# Patient Record
Sex: FEMALE | Race: WHITE | Employment: FULL TIME
[De-identification: names, ages, dates, MRNs, and addresses within clinical notes are randomized per-mention and may not be internally consistent; named-entity substitution may affect disease eponyms.]

---

## 2017-01-09 DIAGNOSIS — R19.5 HEME POSITIVE STOOL: Primary | ICD-10-CM

## 2017-01-09 DIAGNOSIS — Z12.11 COLON CANCER SCREENING: Primary | ICD-10-CM

## 2017-01-09 DIAGNOSIS — Z12.11 COLON CANCER SCREENING: ICD-10-CM

## 2017-01-09 LAB
CONTROL: ABNORMAL
HEMOCCULT STL QL: POSITIVE

## 2017-01-18 ENCOUNTER — TELEPHONE (OUTPATIENT)
Dept: GASTROENTEROLOGY | Facility: CLINIC | Age: 57
End: 2017-01-18

## 2017-02-28 DIAGNOSIS — Z12.11 SCREENING FOR COLON CANCER: Primary | ICD-10-CM

## 2017-03-16 ENCOUNTER — ANESTHESIA (OUTPATIENT)
Dept: ENDOSCOPY | Age: 57
End: 2017-03-16

## 2017-03-16 ENCOUNTER — HOSPITAL ENCOUNTER (OUTPATIENT)
Age: 57
Setting detail: OUTPATIENT SURGERY
Discharge: HOME | End: 2017-03-16
Attending: INTERNAL MEDICINE | Admitting: INTERNAL MEDICINE

## 2017-03-16 ENCOUNTER — ANESTHESIA EVENT (OUTPATIENT)
Dept: ENDOSCOPY | Age: 57
End: 2017-03-16

## 2017-03-16 VITALS
DIASTOLIC BLOOD PRESSURE: 78 MMHG | RESPIRATION RATE: 17 BRPM | SYSTOLIC BLOOD PRESSURE: 121 MMHG | OXYGEN SATURATION: 94 %

## 2017-03-16 VITALS
BODY MASS INDEX: 33.99 KG/M2 | HEIGHT: 61 IN | RESPIRATION RATE: 18 BRPM | HEART RATE: 81 BPM | WEIGHT: 180 LBS | SYSTOLIC BLOOD PRESSURE: 141 MMHG | OXYGEN SATURATION: 98 % | DIASTOLIC BLOOD PRESSURE: 82 MMHG | TEMPERATURE: 98.4 F

## 2017-03-16 PROCEDURE — 3700000000 HC ANESTHESIA ATTENDED CARE: Performed by: INTERNAL MEDICINE

## 2017-03-16 PROCEDURE — 6360000002 HC RX W HCPCS: Performed by: NURSE ANESTHETIST, CERTIFIED REGISTERED

## 2017-03-16 PROCEDURE — 7100000011 HC PHASE II RECOVERY - ADDTL 15 MIN: Performed by: INTERNAL MEDICINE

## 2017-03-16 PROCEDURE — 2580000003 HC RX 258: Performed by: INTERNAL MEDICINE

## 2017-03-16 PROCEDURE — 2500000003 HC RX 250 WO HCPCS: Performed by: NURSE ANESTHETIST, CERTIFIED REGISTERED

## 2017-03-16 PROCEDURE — 3609027000 HC COLONOSCOPY: Performed by: INTERNAL MEDICINE

## 2017-03-16 PROCEDURE — 93005 ELECTROCARDIOGRAM TRACING: CPT

## 2017-03-16 PROCEDURE — 3700000001 HC ADD 15 MINUTES (ANESTHESIA): Performed by: INTERNAL MEDICINE

## 2017-03-16 PROCEDURE — 7100000010 HC PHASE II RECOVERY - FIRST 15 MIN: Performed by: INTERNAL MEDICINE

## 2017-03-16 PROCEDURE — 6360000002 HC RX W HCPCS: Performed by: ANESTHESIOLOGY

## 2017-03-16 RX ORDER — PROPOFOL 10 MG/ML
INJECTION, EMULSION INTRAVENOUS PRN
Status: DISCONTINUED | OUTPATIENT
Start: 2017-03-16 | End: 2017-03-16 | Stop reason: SDUPTHER

## 2017-03-16 RX ORDER — ALBUTEROL SULFATE 2.5 MG/3ML
2.5 SOLUTION RESPIRATORY (INHALATION) ONCE
Status: COMPLETED | OUTPATIENT
Start: 2017-03-16 | End: 2017-03-16

## 2017-03-16 RX ORDER — LIDOCAINE HYDROCHLORIDE 10 MG/ML
INJECTION, SOLUTION EPIDURAL; INFILTRATION; INTRACAUDAL; PERINEURAL PRN
Status: DISCONTINUED | OUTPATIENT
Start: 2017-03-16 | End: 2017-03-16 | Stop reason: SDUPTHER

## 2017-03-16 RX ORDER — SODIUM CHLORIDE 9 MG/ML
INJECTION, SOLUTION INTRAVENOUS CONTINUOUS
Status: DISCONTINUED | OUTPATIENT
Start: 2017-03-16 | End: 2017-03-16 | Stop reason: HOSPADM

## 2017-03-16 RX ADMIN — PROPOFOL 20 MG: 10 INJECTION, EMULSION INTRAVENOUS at 13:10

## 2017-03-16 RX ADMIN — PROPOFOL 30 MG: 10 INJECTION, EMULSION INTRAVENOUS at 13:02

## 2017-03-16 RX ADMIN — PROPOFOL 40 MG: 10 INJECTION, EMULSION INTRAVENOUS at 13:05

## 2017-03-16 RX ADMIN — LIDOCAINE HYDROCHLORIDE 50 MG: 10 INJECTION, SOLUTION EPIDURAL; INFILTRATION; INTRACAUDAL; PERINEURAL at 12:45

## 2017-03-16 RX ADMIN — PROPOFOL 20 MG: 10 INJECTION, EMULSION INTRAVENOUS at 12:50

## 2017-03-16 RX ADMIN — PROPOFOL 30 MG: 10 INJECTION, EMULSION INTRAVENOUS at 12:58

## 2017-03-16 RX ADMIN — PROPOFOL 50 MG: 10 INJECTION, EMULSION INTRAVENOUS at 12:52

## 2017-03-16 RX ADMIN — SODIUM CHLORIDE: 9 INJECTION, SOLUTION INTRAVENOUS at 10:01

## 2017-03-16 RX ADMIN — SODIUM CHLORIDE: 9 INJECTION, SOLUTION INTRAVENOUS at 12:38

## 2017-03-16 RX ADMIN — METOPROLOL TARTRATE 1 MG: 5 INJECTION, SOLUTION INTRAVENOUS at 12:53

## 2017-03-16 RX ADMIN — METOPROLOL TARTRATE 1 MG: 5 INJECTION, SOLUTION INTRAVENOUS at 13:10

## 2017-03-16 RX ADMIN — ALBUTEROL SULFATE 2.5 MG: 2.5 SOLUTION RESPIRATORY (INHALATION) at 10:43

## 2017-03-16 RX ADMIN — PROPOFOL 50 MG: 10 INJECTION, EMULSION INTRAVENOUS at 12:55

## 2017-03-16 RX ADMIN — PROPOFOL 80 MG: 10 INJECTION, EMULSION INTRAVENOUS at 12:49

## 2017-03-16 ASSESSMENT — PAIN SCALES - GENERAL
PAINLEVEL_OUTOF10: 0

## 2017-03-16 ASSESSMENT — PAIN - FUNCTIONAL ASSESSMENT: PAIN_FUNCTIONAL_ASSESSMENT: 0-10

## 2017-03-17 LAB
EKG ATRIAL RATE: 81 BPM
EKG P AXIS: 58 DEGREES
EKG P-R INTERVAL: 134 MS
EKG Q-T INTERVAL: 376 MS
EKG QRS DURATION: 68 MS
EKG QTC CALCULATION (BAZETT): 436 MS
EKG R AXIS: 57 DEGREES
EKG T AXIS: 50 DEGREES
EKG VENTRICULAR RATE: 81 BPM

## 2017-03-30 ENCOUNTER — TELEPHONE (OUTPATIENT)
Dept: INTERNAL MEDICINE | Age: 57
End: 2017-03-30

## 2017-03-30 DIAGNOSIS — K57.31 DIVERTICULOSIS OF LARGE INTESTINE WITH HEMORRHAGE: Primary | ICD-10-CM

## 2017-04-13 ENCOUNTER — OFFICE VISIT (OUTPATIENT)
Dept: INTERNAL MEDICINE | Age: 57
End: 2017-04-13

## 2017-04-13 VITALS
DIASTOLIC BLOOD PRESSURE: 85 MMHG | HEART RATE: 72 BPM | RESPIRATION RATE: 18 BRPM | SYSTOLIC BLOOD PRESSURE: 141 MMHG | WEIGHT: 171 LBS | BODY MASS INDEX: 32.58 KG/M2

## 2017-04-13 DIAGNOSIS — I10 ESSENTIAL HYPERTENSION: Primary | ICD-10-CM

## 2017-04-13 DIAGNOSIS — F17.200 SMOKER: ICD-10-CM

## 2017-04-13 DIAGNOSIS — E78.00 PURE HYPERCHOLESTEROLEMIA: ICD-10-CM

## 2017-04-13 DIAGNOSIS — R06.2 INSPIRATORY WHEEZE ON EXAMINATION: ICD-10-CM

## 2017-04-13 PROCEDURE — 99213 OFFICE O/P EST LOW 20 MIN: CPT | Performed by: INTERNAL MEDICINE

## 2017-04-13 RX ORDER — ATENOLOL 50 MG/1
50 TABLET ORAL DAILY
Refills: 1 | COMMUNITY
Start: 2017-04-10 | End: 2017-10-30 | Stop reason: SDUPTHER

## 2017-04-13 ASSESSMENT — PATIENT HEALTH QUESTIONNAIRE - PHQ9
7. TROUBLE CONCENTRATING ON THINGS, SUCH AS READING THE NEWSPAPER OR WATCHING TELEVISION: 1
4. FEELING TIRED OR HAVING LITTLE ENERGY: 1
9. THOUGHTS THAT YOU WOULD BE BETTER OFF DEAD, OR OF HURTING YOURSELF: 0
SUM OF ALL RESPONSES TO PHQ9 QUESTIONS 1 & 2: 0
10. IF YOU CHECKED OFF ANY PROBLEMS, HOW DIFFICULT HAVE THESE PROBLEMS MADE IT FOR YOU TO DO YOUR WORK, TAKE CARE OF THINGS AT HOME, OR GET ALONG WITH OTHER PEOPLE: 1
2. FEELING DOWN, DEPRESSED OR HOPELESS: 0
6. FEELING BAD ABOUT YOURSELF - OR THAT YOU ARE A FAILURE OR HAVE LET YOURSELF OR YOUR FAMILY DOWN: 0
8. MOVING OR SPEAKING SO SLOWLY THAT OTHER PEOPLE COULD HAVE NOTICED. OR THE OPPOSITE, BEING SO FIGETY OR RESTLESS THAT YOU HAVE BEEN MOVING AROUND A LOT MORE THAN USUAL: 0
1. LITTLE INTEREST OR PLEASURE IN DOING THINGS: 0
SUM OF ALL RESPONSES TO PHQ QUESTIONS 1-9: 4
3. TROUBLE FALLING OR STAYING ASLEEP: 1
5. POOR APPETITE OR OVEREATING: 1

## 2017-04-13 ASSESSMENT — ENCOUNTER SYMPTOMS
ALLERGIC/IMMUNOLOGIC NEGATIVE: 1
RESPIRATORY NEGATIVE: 1
EYES NEGATIVE: 1

## 2017-04-19 ENCOUNTER — HOSPITAL ENCOUNTER (OUTPATIENT)
Dept: GENERAL RADIOLOGY | Age: 57
Discharge: HOME | End: 2017-04-19

## 2017-04-19 DIAGNOSIS — R06.2 INSPIRATORY WHEEZE ON EXAMINATION: ICD-10-CM

## 2017-04-19 DIAGNOSIS — K57.31 DIVERTICULOSIS OF LARGE INTESTINE WITH HEMORRHAGE: ICD-10-CM

## 2017-04-19 DIAGNOSIS — F17.200 SMOKER: ICD-10-CM

## 2017-04-19 PROCEDURE — 71020 XR CHEST STANDARD TWO VW: CPT

## 2017-04-19 PROCEDURE — 74280 X-RAY XM COLON 2CNTRST STD: CPT

## 2017-05-13 DIAGNOSIS — I10 ESSENTIAL HYPERTENSION: ICD-10-CM

## 2017-05-15 RX ORDER — ATENOLOL 50 MG/1
TABLET ORAL
Qty: 30 TABLET | Refills: 11 | Status: SHIPPED | OUTPATIENT
Start: 2017-05-15 | End: 2017-10-30 | Stop reason: DRUGHIGH

## 2017-10-30 ENCOUNTER — OFFICE VISIT (OUTPATIENT)
Dept: INTERNAL MEDICINE | Age: 57
End: 2017-10-30

## 2017-10-30 VITALS
WEIGHT: 176.8 LBS | BODY MASS INDEX: 32.54 KG/M2 | HEART RATE: 65 BPM | SYSTOLIC BLOOD PRESSURE: 144 MMHG | HEIGHT: 62 IN | DIASTOLIC BLOOD PRESSURE: 88 MMHG

## 2017-10-30 DIAGNOSIS — Z83.3 FAMILY HISTORY OF DIABETES MELLITUS (DM): ICD-10-CM

## 2017-10-30 DIAGNOSIS — E78.00 PURE HYPERCHOLESTEROLEMIA: ICD-10-CM

## 2017-10-30 DIAGNOSIS — J45.20 MILD INTERMITTENT ASTHMA WITHOUT COMPLICATION: ICD-10-CM

## 2017-10-30 DIAGNOSIS — I10 ESSENTIAL HYPERTENSION: Primary | ICD-10-CM

## 2017-10-30 DIAGNOSIS — Z11.59 NEED FOR HEPATITIS C SCREENING TEST: ICD-10-CM

## 2017-10-30 DIAGNOSIS — Z11.4 SCREENING FOR HIV (HUMAN IMMUNODEFICIENCY VIRUS): ICD-10-CM

## 2017-10-30 DIAGNOSIS — Z23 NEEDS FLU SHOT: ICD-10-CM

## 2017-10-30 DIAGNOSIS — Z23 NEED FOR PROPHYLACTIC VACCINATION AGAINST STREPTOCOCCUS PNEUMONIAE (PNEUMOCOCCUS): ICD-10-CM

## 2017-10-30 LAB — HBA1C MFR BLD: 5.8 %

## 2017-10-30 PROCEDURE — 99213 OFFICE O/P EST LOW 20 MIN: CPT

## 2017-10-30 PROCEDURE — 90732 PPSV23 VACC 2 YRS+ SUBQ/IM: CPT

## 2017-10-30 PROCEDURE — 83036 HEMOGLOBIN GLYCOSYLATED A1C: CPT | Performed by: INTERNAL MEDICINE

## 2017-10-30 PROCEDURE — 90471 IMMUNIZATION ADMIN: CPT | Performed by: INTERNAL MEDICINE

## 2017-10-30 PROCEDURE — 99214 OFFICE O/P EST MOD 30 MIN: CPT | Performed by: INTERNAL MEDICINE

## 2017-10-30 PROCEDURE — 90472 IMMUNIZATION ADMIN EACH ADD: CPT | Performed by: INTERNAL MEDICINE

## 2017-10-30 PROCEDURE — 90688 IIV4 VACCINE SPLT 0.5 ML IM: CPT

## 2017-10-30 RX ORDER — ALBUTEROL SULFATE 90 UG/1
2 AEROSOL, METERED RESPIRATORY (INHALATION) EVERY 6 HOURS PRN
Qty: 1 INHALER | Refills: 3 | Status: SHIPPED | OUTPATIENT
Start: 2017-10-30 | End: 2018-04-20 | Stop reason: SDUPTHER

## 2017-10-30 RX ORDER — HYDROCHLOROTHIAZIDE 25 MG/1
25 TABLET ORAL DAILY
Qty: 30 TABLET | Refills: 2 | Status: SHIPPED | OUTPATIENT
Start: 2017-10-30 | End: 2018-01-09 | Stop reason: SDUPTHER

## 2017-10-30 RX ORDER — ATENOLOL 100 MG/1
100 TABLET ORAL DAILY
Qty: 30 TABLET | Refills: 2 | Status: SHIPPED | OUTPATIENT
Start: 2017-10-30 | End: 2018-02-20 | Stop reason: SDUPTHER

## 2017-10-30 RX ORDER — PRAVASTATIN SODIUM 40 MG
40 TABLET ORAL DAILY
Qty: 30 TABLET | Refills: 2 | Status: SHIPPED | OUTPATIENT
Start: 2017-10-30 | End: 2018-01-28 | Stop reason: SDUPTHER

## 2017-10-30 ASSESSMENT — ENCOUNTER SYMPTOMS
DOUBLE VISION: 0
HEMOPTYSIS: 0
NAUSEA: 0
BLURRED VISION: 0
HEARTBURN: 0
ABDOMINAL PAIN: 0
COUGH: 0

## 2017-10-30 NOTE — PROGRESS NOTES
current facility-administered medications for this visit. Social History   Substance Use Topics    Smoking status: Current Every Day Smoker     Packs/day: 0.25     Years: 35.00     Types: Cigarettes    Smokeless tobacco: Never Used    Alcohol use No      Comment: recovering alcoholic       Family History   Problem Relation Age of Onset    Heart Disease Mother     Lung Cancer Mother 54    Heart Disease Father     Stroke Father         REVIEW OF SYSTEMS:  Review of Systems   Constitutional: Negative for chills and fever. HENT: Negative for congestion, ear discharge and nosebleeds. Eyes: Negative for blurred vision and double vision. Respiratory: Negative for cough and hemoptysis. Cardiovascular: Negative for chest pain and palpitations. Gastrointestinal: Negative for abdominal pain, heartburn and nausea. Genitourinary: Negative for dysuria and urgency. Musculoskeletal: Negative for myalgias and neck pain. Neurological: Negative for dizziness and headaches. Endo/Heme/Allergies: Does not bruise/bleed easily. Psychiatric/Behavioral: Negative for depression and suicidal ideas. PHYSICAL EXAM:  Vitals:    10/30/17 0832   BP: (!) 144/88   Site: Right Arm   Position: Sitting   Cuff Size: Medium Adult   Pulse: 65   Weight: 176 lb 12.8 oz (80.2 kg)   Height: 5' 1.75\" (1.568 m)     BP Readings from Last 3 Encounters:   10/30/17 (!) 144/88   04/13/17 (!) 141/85   03/16/17 (!) 141/82        Physical Exam   Constitutional: She is oriented to person, place, and time and well-developed, well-nourished, and in no distress. No distress. HENT:   Mouth/Throat: No oropharyngeal exudate. Neck: Normal range of motion. Neck supple. No thyromegaly present. Cardiovascular: Normal rate, regular rhythm, normal heart sounds and intact distal pulses. Pulmonary/Chest: Effort normal and breath sounds normal. No respiratory distress. She has no wheezes. Abdominal: Soft.  Bowel sounds are normal. She IM, MDV, 0.5mL (FLUZONE QUADV)  -     [place] IMMUNIZ ADMIN,1 SINGLE/COMB VAC/TOXOID    Screening for HIV (human immunodeficiency virus)  -     HIV-1 And HIV-2 Antibodies; Future    Need for hepatitis C screening test  -     Hepatitis Panel, Acute; Future    Other orders  -     Cancel: Glucose, Fasting; Future      FOLLOW UP AND INSTRUCTIONS:  · Return in about 3 months (around 1/30/2018) for HTN, HLD. · Lisbeth Osorio received counseling on the following healthy behaviors: nutrition and exercise    · Discussed use, benefit, and side effects of prescribed medications. Barriers to medication compliance addressed. All patient questions answered. Pt voiced understanding. · Patient given educational materials - see patient instructions    Agustin Beauchamp MD, LALA, [address] [place] [address] Internal Medicine Associate  10/30/2017, 10:42 AM    This note is created with the assistance of a speech-recognition program. While intending to generate a document that actually reflects the content of the visit, the document can still have some mistakes which may not have been identified and corrected by editing.

## 2017-10-30 NOTE — PROGRESS NOTES
HYPERTENSION visit     BP Readings from Last 3 Encounters:   04/13/17 (!) 141/85   03/16/17 (!) 141/82   03/16/17 121/78       LDL Cholesterol (mg/dL)   Date Value   09/08/2014 90     HDL (mg/dL)   Date Value   09/08/2014 36 (L)     BUN (mg/dL)   Date Value   09/08/2014 14     CREATININE (mg/dL)   Date Value   09/08/2014 0.84     Glucose (mg/dL)   Date Value   09/08/2014 82   04/10/2012 92              Have you changed or started any medications since your last visit including any over-the-counter medicines, vitamins, or herbal medicines? no   Have you stopped taking any of your medications? Is so, why? -  no  Are you having any side effects from any of your medications? - no    Have you seen any other physician or provider since your last visit?  no   Have you had any other diagnostic tests since your last visit? yes - Xray   Have you been seen in the emergency room and/or had an admission in a hospital since we last saw you?  no   Have you had your routine dental cleaning in the past 6 months?  no     Do you have an active MyChart account? If no, what is the barrier?   No: Declined    Patient Care Team:  Brittany Mcqueen MD as PCP - General (Internal Medicine)    Medical History Review  Past Medical, Family, and Social History reviewed and does contribute to the patient presenting condition    Health Maintenance   Topic Date Due    Pneumococcal med risk (1 of 1 - PPSV23) 04/12/1977    Diabetes screen  04/12/1998    Flu vaccine (1) 09/01/2017    Hepatitis C screen  12/28/2017 (Originally 1960)    HIV screen  12/28/2017 (Originally 4/12/1973)    Cervical cancer screen  11/23/2018    Breast cancer screen  01/30/2019    Lipid screen  09/08/2019    Colon cancer screen colonoscopy  03/16/2022    DTaP/Tdap/Td vaccine (2 - Td) 09/08/2024

## 2017-10-30 NOTE — LETTER
[address]  [address] [facility] [address] [facility]  Phone: [phone]  Fax: [phone]    Agustin Hong MD        October 30, 2017     Patient: Karlo Garza   YOB: 1960   Date of Visit: 10/30/2017       To Whom it May Concern:    Duc Yeung was seen in my clinic on 10/30/2017. If you have any questions or concerns, please don't hesitate to call.     Sincerely,         Agustin Hong MD

## 2018-01-09 DIAGNOSIS — I10 ESSENTIAL HYPERTENSION: ICD-10-CM

## 2018-01-09 RX ORDER — HYDROCHLOROTHIAZIDE 25 MG/1
TABLET ORAL
Qty: 30 TABLET | Refills: 2 | Status: SHIPPED | OUTPATIENT
Start: 2018-01-09 | End: 2018-04-20 | Stop reason: SDUPTHER

## 2018-01-28 DIAGNOSIS — E78.00 PURE HYPERCHOLESTEROLEMIA: ICD-10-CM

## 2018-01-29 RX ORDER — PRAVASTATIN SODIUM 40 MG
TABLET ORAL
Qty: 30 TABLET | Refills: 2 | Status: SHIPPED | OUTPATIENT
Start: 2018-01-29 | End: 2018-04-20 | Stop reason: SDUPTHER

## 2018-02-20 DIAGNOSIS — I10 ESSENTIAL HYPERTENSION: ICD-10-CM

## 2018-02-21 RX ORDER — ATENOLOL 100 MG/1
TABLET ORAL
Qty: 30 TABLET | Refills: 2 | Status: SHIPPED | OUTPATIENT
Start: 2018-02-21 | End: 2018-04-20 | Stop reason: SDUPTHER

## 2018-04-20 ENCOUNTER — HOSPITAL ENCOUNTER (OUTPATIENT)
Age: 58
Setting detail: SPECIMEN
Discharge: HOME | End: 2018-04-20

## 2018-04-20 ENCOUNTER — OFFICE VISIT (OUTPATIENT)
Dept: INTERNAL MEDICINE | Age: 58
End: 2018-04-20

## 2018-04-20 VITALS
SYSTOLIC BLOOD PRESSURE: 128 MMHG | HEART RATE: 77 BPM | HEIGHT: 62 IN | WEIGHT: 176.1 LBS | DIASTOLIC BLOOD PRESSURE: 78 MMHG | BODY MASS INDEX: 32.41 KG/M2

## 2018-04-20 DIAGNOSIS — I10 ESSENTIAL HYPERTENSION: ICD-10-CM

## 2018-04-20 DIAGNOSIS — I10 ESSENTIAL HYPERTENSION: Primary | ICD-10-CM

## 2018-04-20 DIAGNOSIS — J45.20 MILD INTERMITTENT ASTHMA WITHOUT COMPLICATION: ICD-10-CM

## 2018-04-20 DIAGNOSIS — E78.00 PURE HYPERCHOLESTEROLEMIA: ICD-10-CM

## 2018-04-20 LAB
ANION GAP SERPL CALCULATED.3IONS-SCNC: 13 MMOL/L (ref 9–17)
BUN BLDV-MCNC: 18 MG/DL (ref 8–23)
BUN/CREAT BLD: ABNORMAL (ref 9–20)
CALCIUM SERPL-MCNC: 9 MG/DL (ref 8.6–10.4)
CHLORIDE BLD-SCNC: 99 MMOL/L (ref 98–107)
CO2: 30 MMOL/L (ref 20–31)
CREAT SERPL-MCNC: 1 MG/DL (ref 0.5–0.9)
GFR AFRICAN AMERICAN: >60 ML/MIN
GFR NON-AFRICAN AMERICAN: 57 ML/MIN
GFR SERPL CREATININE-BSD FRML MDRD: ABNORMAL ML/MIN/{1.73_M2}
GFR SERPL CREATININE-BSD FRML MDRD: ABNORMAL ML/MIN/{1.73_M2}
GLUCOSE BLD-MCNC: 87 MG/DL (ref 70–99)
POTASSIUM SERPL-SCNC: 3.9 MMOL/L (ref 3.7–5.3)
SODIUM BLD-SCNC: 142 MMOL/L (ref 135–144)

## 2018-04-20 PROCEDURE — 80048 BASIC METABOLIC PNL TOTAL CA: CPT

## 2018-04-20 PROCEDURE — 99214 OFFICE O/P EST MOD 30 MIN: CPT | Performed by: INTERNAL MEDICINE

## 2018-04-20 PROCEDURE — 36415 COLL VENOUS BLD VENIPUNCTURE: CPT

## 2018-04-20 PROCEDURE — 99212 OFFICE O/P EST SF 10 MIN: CPT

## 2018-04-20 RX ORDER — ALBUTEROL SULFATE 90 UG/1
2 AEROSOL, METERED RESPIRATORY (INHALATION) EVERY 6 HOURS PRN
Qty: 1 INHALER | Refills: 2 | Status: SHIPPED | OUTPATIENT
Start: 2018-04-20 | End: 2019-05-30 | Stop reason: SDUPTHER

## 2018-04-20 RX ORDER — HYDROCHLOROTHIAZIDE 25 MG/1
TABLET ORAL
Qty: 30 TABLET | Refills: 2 | Status: SHIPPED | OUTPATIENT
Start: 2018-04-20 | End: 2018-07-11 | Stop reason: SDUPTHER

## 2018-04-20 RX ORDER — PRAVASTATIN SODIUM 40 MG
TABLET ORAL
Qty: 30 TABLET | Refills: 2 | Status: SHIPPED | OUTPATIENT
Start: 2018-04-20 | End: 2018-07-11 | Stop reason: SDUPTHER

## 2018-04-20 RX ORDER — ATENOLOL 100 MG/1
TABLET ORAL
Qty: 30 TABLET | Refills: 2 | Status: SHIPPED | OUTPATIENT
Start: 2018-04-20 | End: 2018-08-07 | Stop reason: SDUPTHER

## 2018-04-20 ASSESSMENT — PATIENT HEALTH QUESTIONNAIRE - PHQ9
SUM OF ALL RESPONSES TO PHQ QUESTIONS 1-9: 0
2. FEELING DOWN, DEPRESSED OR HOPELESS: 0
SUM OF ALL RESPONSES TO PHQ9 QUESTIONS 1 & 2: 0
1. LITTLE INTEREST OR PLEASURE IN DOING THINGS: 0

## 2018-04-20 ASSESSMENT — ENCOUNTER SYMPTOMS
HEARTBURN: 0
DOUBLE VISION: 0
ABDOMINAL PAIN: 0
COUGH: 0
NAUSEA: 0
BLURRED VISION: 0
HEMOPTYSIS: 0

## 2018-05-24 ENCOUNTER — TELEPHONE (OUTPATIENT)
Dept: INTERNAL MEDICINE | Age: 58
End: 2018-05-24

## 2018-07-02 ENCOUNTER — TELEPHONE (OUTPATIENT)
Dept: INTERNAL MEDICINE | Age: 58
End: 2018-07-02

## 2018-07-11 DIAGNOSIS — E78.00 PURE HYPERCHOLESTEROLEMIA: ICD-10-CM

## 2018-07-11 DIAGNOSIS — I10 ESSENTIAL HYPERTENSION: ICD-10-CM

## 2018-07-11 RX ORDER — HYDROCHLOROTHIAZIDE 25 MG/1
TABLET ORAL
Qty: 30 TABLET | Refills: 2 | Status: SHIPPED | OUTPATIENT
Start: 2018-07-11 | End: 2018-10-03 | Stop reason: SDUPTHER

## 2018-07-11 RX ORDER — PRAVASTATIN SODIUM 40 MG
TABLET ORAL
Qty: 30 TABLET | Refills: 2 | Status: SHIPPED | OUTPATIENT
Start: 2018-07-11 | End: 2018-10-03 | Stop reason: SDUPTHER

## 2018-07-11 NOTE — TELEPHONE ENCOUNTER
E-scribe request for HYDROCHLOROTHIAZIDE 25 MG TAB and PRAVASTATIN 40 MG TAB. Please review and e-scribe if applicable.      Last Visit Date:  4/20/18  Next Visit Date:  Tickler Call in Place for f/u    Hemoglobin A1C (%)   Date Value   10/30/2017 5.8             ( goal A1C is < 7)   No results found for: LABMICR  LDL Cholesterol (mg/dL)   Date Value   09/08/2014 90       (goal LDL is <100)   BUN (mg/dL)   Date Value   04/20/2018 18     BP Readings from Last 3 Encounters:   04/20/18 128/78   10/30/17 (!) 144/88   04/13/17 (!) 141/85          (goal 120/80)        Patient Active Problem List:     Essential hypertension     Pure hypercholesterolemia     Mild intermittent asthma without complication      ----JF

## 2018-08-07 DIAGNOSIS — I10 ESSENTIAL HYPERTENSION: ICD-10-CM

## 2018-08-07 RX ORDER — ATENOLOL 100 MG/1
TABLET ORAL
Qty: 30 TABLET | Refills: 2 | Status: SHIPPED | OUTPATIENT
Start: 2018-08-07 | End: 2018-11-15 | Stop reason: SDUPTHER

## 2018-10-03 DIAGNOSIS — E78.00 PURE HYPERCHOLESTEROLEMIA: ICD-10-CM

## 2018-10-03 DIAGNOSIS — I10 ESSENTIAL HYPERTENSION: ICD-10-CM

## 2018-10-03 RX ORDER — PRAVASTATIN SODIUM 40 MG
TABLET ORAL
Qty: 30 TABLET | Refills: 2 | Status: SHIPPED | OUTPATIENT
Start: 2018-10-03 | End: 2018-12-28 | Stop reason: SDUPTHER

## 2018-10-03 RX ORDER — HYDROCHLOROTHIAZIDE 25 MG/1
TABLET ORAL
Qty: 30 TABLET | Refills: 2 | Status: SHIPPED | OUTPATIENT
Start: 2018-10-03 | End: 2018-12-28 | Stop reason: SDUPTHER

## 2018-11-15 DIAGNOSIS — I10 ESSENTIAL HYPERTENSION: ICD-10-CM

## 2018-11-15 RX ORDER — ATENOLOL 100 MG/1
100 TABLET ORAL DAILY
Qty: 30 TABLET | Refills: 2 | Status: SHIPPED | OUTPATIENT
Start: 2018-11-15 | End: 2019-02-03 | Stop reason: SDUPTHER

## 2018-12-28 DIAGNOSIS — E78.00 PURE HYPERCHOLESTEROLEMIA: ICD-10-CM

## 2018-12-28 DIAGNOSIS — I10 ESSENTIAL HYPERTENSION: ICD-10-CM

## 2018-12-28 RX ORDER — HYDROCHLOROTHIAZIDE 25 MG/1
25 TABLET ORAL DAILY
Qty: 30 TABLET | Refills: 2 | Status: SHIPPED | OUTPATIENT
Start: 2018-12-28 | End: 2019-05-18 | Stop reason: SDUPTHER

## 2018-12-28 RX ORDER — PRAVASTATIN SODIUM 40 MG
40 TABLET ORAL DAILY
Qty: 30 TABLET | Refills: 2 | Status: SHIPPED | OUTPATIENT
Start: 2018-12-28 | End: 2019-05-17 | Stop reason: SDUPTHER

## 2018-12-28 NOTE — TELEPHONE ENCOUNTER
Refill on medication.     Last visit: 4/20/18  Last Med refill: 12/3/18  Does patient have enough medication for 72 hours: Yes    Next Visit Date:  Future Appointments  Date Time Provider Tessa Spencer   2/13/2019 2:45 PM Agustin Russell MD [address] [facility] Maintenance   Topic Date Due    Hepatitis C screen  1960    HIV screen  04/12/1973    Shingles Vaccine (1 of 2 - 2 Dose Series) 04/12/2008    Flu vaccine (1) 09/01/2018    A1C test (Diabetic or Prediabetic)  10/30/2018    Cervical cancer screen  11/23/2018    Breast cancer screen  01/30/2019    Potassium monitoring  04/20/2019    Creatinine monitoring  04/20/2019    Lipid screen  09/08/2019    Colon cancer screen colonoscopy  03/16/2022    DTaP/Tdap/Td vaccine (2 - Td) 09/08/2024    Pneumococcal med risk  Completed       Hemoglobin A1C (%)   Date Value   10/30/2017 5.8             ( goal A1C is < 7)   No results found for: LABMICR  LDL Cholesterol (mg/dL)   Date Value   09/08/2014 90   03/20/2014 108 (H)       (goal LDL is <100)   BUN (mg/dL)   Date Value   04/20/2018 18     BP Readings from Last 3 Encounters:   04/20/18 128/78   10/30/17 (!) 144/88   04/13/17 (!) 141/85          (goal 120/80)    All Future Testing planned in [facility]              Patient Active Problem List:     Essential hypertension     Pure hypercholesterolemia     Mild intermittent asthma without complication

## 2019-02-03 DIAGNOSIS — I10 ESSENTIAL HYPERTENSION: ICD-10-CM

## 2019-02-04 RX ORDER — ATENOLOL 100 MG/1
TABLET ORAL
Qty: 90 TABLET | Refills: 2 | Status: SHIPPED | OUTPATIENT
Start: 2019-02-04 | End: 2019-05-17 | Stop reason: SDUPTHER

## 2019-05-17 DIAGNOSIS — E78.00 PURE HYPERCHOLESTEROLEMIA: ICD-10-CM

## 2019-05-17 DIAGNOSIS — I10 ESSENTIAL HYPERTENSION: ICD-10-CM

## 2019-05-17 NOTE — TELEPHONE ENCOUNTER
Pravastatin and hydrochlorothiazide pending for refill     Health Maintenance   Topic Date Due    Hepatitis C screen  1960    HIV screen  04/12/1973    Shingles Vaccine (1 of 2) 04/12/2008    A1C test (Diabetic or Prediabetic)  10/30/2018    Cervical cancer screen  11/23/2018    Breast cancer screen  01/30/2019    Potassium monitoring  04/20/2019    Creatinine monitoring  04/20/2019    Flu vaccine (Season Ended) 09/01/2019    Lipid screen  09/08/2019    Colon cancer screen colonoscopy  03/16/2022    DTaP/Tdap/Td vaccine (2 - Td) 09/08/2024    Pneumococcal 0-64 years Vaccine  Completed             (applicable per patient's age: Cancer Screenings, Depression Screening, Fall Risk Screening, Immunizations)    Hemoglobin A1C (%)   Date Value   10/30/2017 5.8     LDL Cholesterol (mg/dL)   Date Value   09/08/2014 90     BUN (mg/dL)   Date Value   04/20/2018 18      (goal A1C is < 7)   (goal LDL is <100) need 30-50% reduction from baseline     BP Readings from Last 3 Encounters:   04/20/18 128/78   10/30/17 (!) 144/88   04/13/17 (!) 141/85    (goal /80)      All Future Testing planned in [facility]:      Next Visit Date:  Future Appointments   Date Time Provider Tessa Spencer   5/30/2019  2:15 PM Agustin Benitez MD [id number]            Patient Active Problem List:     Essential hypertension     Pure hypercholesterolemia     Mild intermittent asthma without complication

## 2019-05-17 NOTE — TELEPHONE ENCOUNTER
Refill for med pended        Next Visit Date:  Future Appointments   Date Time Provider Tessa Spencer   5/30/2019  2:15 PM Agustin Manzanares MD [address] [facility]   Topic Date Due    Hepatitis C screen  1960    HIV screen  04/12/1973    Shingles Vaccine (1 of 2) 04/12/2008    A1C test (Diabetic or Prediabetic)  10/30/2018    Cervical cancer screen  11/23/2018    Breast cancer screen  01/30/2019    Potassium monitoring  04/20/2019    Creatinine monitoring  04/20/2019    Flu vaccine (Season Ended) 09/01/2019    Lipid screen  09/08/2019    Colon cancer screen colonoscopy  03/16/2022    DTaP/Tdap/Td vaccine (2 - Td) 09/08/2024    Pneumococcal 0-64 years Vaccine  Completed       Hemoglobin A1C (%)   Date Value   10/30/2017 5.8             ( goal A1C is < 7)   No results found for: LABMICR  LDL Cholesterol (mg/dL)   Date Value   09/08/2014 90   03/20/2014 108 (H)       (goal LDL is <100)   BUN (mg/dL)   Date Value   04/20/2018 18     BP Readings from Last 3 Encounters:   04/20/18 128/78   10/30/17 (!) 144/88   04/13/17 (!) 141/85          (goal 120/80)    All Future Testing planned in [facility]              Patient Active Problem List:     Essential hypertension     Pure hypercholesterolemia     Mild intermittent asthma without complication

## 2019-05-18 RX ORDER — PRAVASTATIN SODIUM 40 MG
TABLET ORAL
Qty: 90 TABLET | Refills: 2 | Status: SHIPPED | OUTPATIENT
Start: 2019-05-18 | End: 2020-02-03

## 2019-05-18 RX ORDER — HYDROCHLOROTHIAZIDE 25 MG/1
25 TABLET ORAL DAILY
Qty: 30 TABLET | Refills: 2 | Status: SHIPPED | OUTPATIENT
Start: 2019-05-18 | End: 2020-02-10 | Stop reason: SDUPTHER

## 2019-05-18 RX ORDER — HYDROCHLOROTHIAZIDE 25 MG/1
TABLET ORAL
Qty: 90 TABLET | Refills: 2 | Status: SHIPPED | OUTPATIENT
Start: 2019-05-18 | End: 2019-05-30 | Stop reason: SDUPTHER

## 2019-05-18 RX ORDER — ATENOLOL 100 MG/1
TABLET ORAL
Qty: 90 TABLET | Refills: 2 | Status: SHIPPED | OUTPATIENT
Start: 2019-05-18 | End: 2020-01-02

## 2019-05-30 ENCOUNTER — OFFICE VISIT (OUTPATIENT)
Dept: INTERNAL MEDICINE | Age: 59
End: 2019-05-30

## 2019-05-30 VITALS
SYSTOLIC BLOOD PRESSURE: 151 MMHG | DIASTOLIC BLOOD PRESSURE: 85 MMHG | BODY MASS INDEX: 31.69 KG/M2 | WEIGHT: 172.2 LBS | HEART RATE: 70 BPM | HEIGHT: 62 IN

## 2019-05-30 DIAGNOSIS — Z83.3 FAMILY HISTORY OF DIABETES MELLITUS (DM): ICD-10-CM

## 2019-05-30 DIAGNOSIS — Z12.31 ENCOUNTER FOR SCREENING MAMMOGRAM FOR BREAST CANCER: ICD-10-CM

## 2019-05-30 DIAGNOSIS — R73.03 PRE-DIABETES: ICD-10-CM

## 2019-05-30 DIAGNOSIS — I10 ESSENTIAL HYPERTENSION: ICD-10-CM

## 2019-05-30 DIAGNOSIS — J45.20 MILD INTERMITTENT ASTHMA WITHOUT COMPLICATION: Primary | ICD-10-CM

## 2019-05-30 DIAGNOSIS — Z11.59 NEED FOR HEPATITIS C SCREENING TEST: ICD-10-CM

## 2019-05-30 LAB — HBA1C MFR BLD: 6 %

## 2019-05-30 PROCEDURE — 83036 HEMOGLOBIN GLYCOSYLATED A1C: CPT | Performed by: INTERNAL MEDICINE

## 2019-05-30 PROCEDURE — 99214 OFFICE O/P EST MOD 30 MIN: CPT | Performed by: INTERNAL MEDICINE

## 2019-05-30 PROCEDURE — 99211 OFF/OP EST MAY X REQ PHY/QHP: CPT | Performed by: INTERNAL MEDICINE

## 2019-05-30 RX ORDER — ALBUTEROL SULFATE 90 UG/1
2 AEROSOL, METERED RESPIRATORY (INHALATION) EVERY 6 HOURS PRN
Qty: 1 INHALER | Refills: 2 | Status: SHIPPED | OUTPATIENT
Start: 2019-05-30 | End: 2020-02-10 | Stop reason: SDUPTHER

## 2019-05-30 ASSESSMENT — ENCOUNTER SYMPTOMS
WHEEZING: 0
PHOTOPHOBIA: 0
BLOOD IN STOOL: 0
COUGH: 0
SORE THROAT: 0
DIARRHEA: 0
CONSTIPATION: 0
SHORTNESS OF BREATH: 0
COLOR CHANGE: 0
ABDOMINAL PAIN: 0
EYE PAIN: 0
NAUSEA: 0
VOMITING: 0
EYE DISCHARGE: 0

## 2019-05-30 NOTE — PROGRESS NOTES
HYPERTENSION visit     BP Readings from Last 3 Encounters:   04/20/18 128/78   10/30/17 (!) 144/88   04/13/17 (!) 141/85       LDL Cholesterol (mg/dL)   Date Value   09/08/2014 90     HDL (mg/dL)   Date Value   09/08/2014 36 (L)     BUN (mg/dL)   Date Value   04/20/2018 18     CREATININE (mg/dL)   Date Value   04/20/2018 1.00 (H)     Glucose (mg/dL)   Date Value   04/20/2018 87   04/10/2012 92              Have you changed or started any medications since your last visit including any over-the-counter medicines, vitamins, or herbal medicines? no   Have you stopped taking any of your medications? Is so, why? -  no  Are you having any side effects from any of your medications? - no  How often do you miss doses of your medication? rare      Have you seen any other physician or provider since your last visit?  no   Have you had any other diagnostic tests since your last visit?  no   Have you been seen in the emergency room and/or had an admission in a hospital since we last saw you?  no   Have you had your routine dental cleaning in the past 6 months?  no     Do you have an active MyChart account? If no, what is the barrier?   No:     Patient Care Team:  Agustin Duvall MD as PCP - General (Internal Medicine)    Medical History Review  Past Medical, Family, and Social History reviewed and does contribute to the patient presenting condition    Health Maintenance   Topic Date Due    Hepatitis C screen  1960    HIV screen  04/12/1973    Shingles Vaccine (1 of 2) 04/12/2008    A1C test (Diabetic or Prediabetic)  10/30/2018    Cervical cancer screen  11/23/2018    Breast cancer screen  01/30/2019    Potassium monitoring  04/20/2019    Creatinine monitoring  04/20/2019    Flu vaccine (Season Ended) 09/01/2019    Lipid screen  09/08/2019    Colon cancer screen colonoscopy  03/16/2022    DTaP/Tdap/Td vaccine (2 - Td) 09/08/2024    Pneumococcal 0-64 years Vaccine  Completed

## 2019-05-30 NOTE — PROGRESS NOTES
[facility]  [facility] [address] [facility]  [address] [facility] [address]  Dept: [phone]  Dept Fax: [phone]    Office Progress/Follow Up Note  Date ofpatient's visit: 5/30/2019  Patient's Name:  Keila Ling YOB: 1960            Patient Care Team:  Agusitn Muniz MD as PCP - General (Internal Medicine)  ================================================================    REASON FOR VISIT/CHIEF COMPLAINT:  Hypertension and Medication Refill    HISTORY OF PRESENTING ILLNESS:  History was obtained from: patient. Dianna Dancer a 62 y.o. is here for a follow-up visit. No new complaints today. Patient has hypertension and takes hydrochlorothiazide and atenolol. She reported that she didn't take her medication today. She has dyslipidemia and takes Pravachol. Screening for diabetes shows prediabetes with A1c of 6.0. I've discussed lifestyle modifications and medications. She is due for medication refills today. She is also due for blood work.   Problem list, medications and blood work reviewed      Patient Active Problem List   Diagnosis    Essential hypertension    Pure hypercholesterolemia    Mild intermittent asthma without complication    Pre-diabetes       Health Maintenance Due   Topic Date Due    Hepatitis C screen  1960    HIV screen  04/12/1973    Shingles Vaccine (1 of 2) 04/12/2008    Cervical cancer screen  11/23/2018    Breast cancer screen  01/30/2019    Potassium monitoring  04/20/2019    Creatinine monitoring  04/20/2019       No Known Allergies      Current Outpatient Medications   Medication Sig Dispense Refill    albuterol sulfate HFA (PROVENTIL HFA) 108 (90 Base) MCG/ACT inhaler Inhale 2 puffs into the lungs every 6 hours as needed for Wheezing 1 Inhaler 2    metFORMIN (GLUCOPHAGE) 500 MG tablet Take 1 tablet by mouth daily (with breakfast) 30 tablet 5    hydrochlorothiazide (HYDRODIURIL) 25 MG tablet Take Physical Exam   Constitutional: She is oriented to person, place, and time. No distress. HENT:   Head: Normocephalic and atraumatic. Right Ear: External ear normal.   Left Ear: External ear normal.   Nose: Nose normal.   Mouth/Throat: No oropharyngeal exudate. Eyes: Pupils are equal, round, and reactive to light. EOM are normal.   Neck: Normal range of motion. Neck supple. No thyromegaly present. Cardiovascular: Normal rate, regular rhythm, normal heart sounds and intact distal pulses. Pulmonary/Chest: Effort normal and breath sounds normal. No respiratory distress. She has no wheezes. Abdominal: Soft. Bowel sounds are normal. She exhibits no distension and no mass. There is no tenderness. Musculoskeletal: Normal range of motion. She exhibits no edema or tenderness. Neurological: She is alert and oriented to person, place, and time. No cranial nerve deficit. Skin: Skin is warm. No rash noted. She is not diaphoretic. No erythema. Psychiatric: Judgment normal.         DIAGNOSTIC FINDINGS:  CBC:No results found for: WBC, HGB, PLT    BMP:    Lab Results   Component Value Date     04/20/2018    K 3.9 04/20/2018    CL 99 04/20/2018    CO2 30 04/20/2018    BUN 18 04/20/2018    CREATININE 1.00 04/20/2018    GLUCOSE 87 04/20/2018    GLUCOSE 92 04/10/2012       HEMOGLOBIN A1C:   Lab Results   Component Value Date    LABA1C 6.0 05/30/2019       FASTING LIPID PANEL:  Lab Results   Component Value Date    CHOL 171 09/08/2014    HDL 36 (L) 09/08/2014    TRIG 226 (H) 09/08/2014       ASSESSMENT AND PLAN:  Jennifer Pearl was seen today for hypertension and medication refill. Diagnoses and all orders for this visit:    Mild intermittent asthma without complication  -     albuterol sulfate HFA (PROVENTIL HFA) 108 (90 Base) MCG/ACT inhaler; Inhale 2 puffs into the lungs every 6 hours as needed for Wheezing    Encounter for screening mammogram for breast cancer  -     [facility] Digital Screen Bilateral [[id number]]; Future    Family history of diabetes mellitus (DM)  -     POCT glycosylated hemoglobin (Hb A1C)    Essential hypertension  -     Basic Metabolic Panel; Future    Need for hepatitis C screening test  -     Hepatitis Panel, Acute; Future    Pre-diabetes  -     metFORMIN (GLUCOPHAGE) 500 MG tablet; Take 1 tablet by mouth daily (with breakfast)      FOLLOW UP AND INSTRUCTIONS:  · Return in about 3 months (around 8/30/2019). · Court Sonali received counseling on the following healthy behaviors: nutrition and exercise    · Discussed use, benefit, and side effects of prescribed medications. Barriers to medication compliance addressed. All patient questions answered. Pt voiced understanding. · Patient given educational materials - see patient instructions    Agustin Delacruz M.D., F.A.C.P. Internal Medicine  5/30/2019, 2:39 PM    This note is created with the assistance of a speech-recognition program. While intending to generate a document that actually reflects the content of thevisit, the document can still have some mistakes which may not have been identified and corrected by editing.

## 2019-05-30 NOTE — PATIENT INSTRUCTIONS
We will call you to schedule your 3 month follow up appointment. Please return to the clinic as needed. MAMMOGRAM INSTRUCTIONS    Your physician has ordered a mammogram for you. Mammography is an X-ray that creates an image of the breast.     To prepare yourself for the test shower, or bathe, as usual, but do not use any deodorants, powders, or perfumes under or around the breast or chest area. You will be called by the Scheduling Department to schedule your testing. If you do not hear from them within a week, please call them at [phone] to schedule the appointment. This will be done at any [facility] location of your choice. Report to the Admitting Department 20 minutes before the test to complete the proper paperwork. If you cannot keep this appointment, please call [phone] to cancel and reschedule your appointment. LABORATORY INSTRUCTIONS    Your doctor has ordered blood or urine testing. You can get this testing done at the Lab located on the first floor of the [facility], or at any other [facility]. Please stop at Main Registration, before going to the lab, as you must be registered first.     Please get this lab done before your next visit. You may eat or drink before this test.    Return To Clinic Visit date not found. After Visit Summary  given and reviewed. --MA    It is very important for your care that you keep your appointment. If for some reason you are unable to keep your appointment it is equally important that you call our office at [phone] to cancel your appointment and reschedule. Failure to do so may result in your termination from our practice.

## 2019-09-16 ENCOUNTER — TELEPHONE (OUTPATIENT)
Dept: INTERNAL MEDICINE | Age: 59
End: 2019-09-16

## 2019-12-31 NOTE — TELEPHONE ENCOUNTER
Health Maintenance   Topic Date Due    Hepatitis C screen  1960    HIV screen  04/12/1973    Shingles Vaccine (1 of 2) 04/12/2008    Lipid screen  09/08/2015    Cervical cancer screen  11/23/2018    Breast cancer screen  01/30/2019    Potassium monitoring  04/20/2019    Creatinine monitoring  04/20/2019    Flu vaccine (1) 09/01/2019    A1C test (Diabetic or Prediabetic)  05/30/2020    Colon cancer screen colonoscopy  03/16/2022    DTaP/Tdap/Td vaccine (2 - Td) 09/08/2024    Pneumococcal 0-64 years Vaccine  Completed             (applicable per patient's age: Cancer Screenings, Depression Screening, Fall Risk Screening, Immunizations)    Hemoglobin A1C (%)   Date Value   05/30/2019 6.0   10/30/2017 5.8     LDL Cholesterol (mg/dL)   Date Value   09/08/2014 90     BUN (mg/dL)   Date Value   04/20/2018 18      (goal A1C is < 7)   (goal LDL is <100) need 30-50% reduction from baseline     BP Readings from Last 3 Encounters:   05/30/19 (!) 151/85   04/20/18 128/78   10/30/17 (!) 144/88    (goal /80)      All Future Testing planned in [facility]:  Lab Frequency Next Occurrence   [facility] Digital Screen Bilateral [[id number]] Once 00/25/1081   Basic Metabolic Panel Once 84/80/5326   Hepatitis Panel, Acute Once 01/25/2020       Next Visit Date:  No future appointments.          Patient Active Problem List:     Essential hypertension     Pure hypercholesterolemia     Mild intermittent asthma without complication     Pre-diabetes

## 2020-01-02 RX ORDER — ATENOLOL 100 MG/1
TABLET ORAL
Qty: 90 TABLET | Refills: 2 | Status: SHIPPED | OUTPATIENT
Start: 2020-01-02 | End: 2020-02-10 | Stop reason: SDUPTHER

## 2020-02-10 ENCOUNTER — HOSPITAL ENCOUNTER (OUTPATIENT)
Age: 60
Setting detail: SPECIMEN
Discharge: HOME | End: 2020-02-10
Payer: OTHER GOVERNMENT

## 2020-02-10 ENCOUNTER — OFFICE VISIT (OUTPATIENT)
Dept: INTERNAL MEDICINE | Age: 60
End: 2020-02-10
Payer: OTHER GOVERNMENT

## 2020-02-10 VITALS
DIASTOLIC BLOOD PRESSURE: 74 MMHG | HEIGHT: 61 IN | WEIGHT: 169 LBS | BODY MASS INDEX: 31.91 KG/M2 | SYSTOLIC BLOOD PRESSURE: 139 MMHG | HEART RATE: 71 BPM

## 2020-02-10 LAB
CHOLESTEROL, FASTING: 208 MG/DL
CHOLESTEROL/HDL RATIO: 4.3
ESTIMATED AVERAGE GLUCOSE: 120 MG/DL
HBA1C MFR BLD: 5.8 % (ref 4–6)
HDLC SERPL-MCNC: 48 MG/DL
HEPATITIS C ANTIBODY: NONREACTIVE
LDL CHOLESTEROL: 93 MG/DL (ref 0–130)
TRIGLYCERIDE, FASTING: 337 MG/DL
VLDLC SERPL CALC-MCNC: ABNORMAL MG/DL (ref 1–30)

## 2020-02-10 PROCEDURE — 36415 COLL VENOUS BLD VENIPUNCTURE: CPT

## 2020-02-10 PROCEDURE — 86803 HEPATITIS C AB TEST: CPT

## 2020-02-10 PROCEDURE — 80061 LIPID PANEL: CPT

## 2020-02-10 PROCEDURE — 99214 OFFICE O/P EST MOD 30 MIN: CPT | Performed by: NURSE PRACTITIONER

## 2020-02-10 PROCEDURE — 83036 HEMOGLOBIN GLYCOSYLATED A1C: CPT

## 2020-02-10 PROCEDURE — 99211 OFF/OP EST MAY X REQ PHY/QHP: CPT | Performed by: NURSE PRACTITIONER

## 2020-02-10 RX ORDER — HYDROCHLOROTHIAZIDE 25 MG/1
25 TABLET ORAL DAILY
Qty: 30 TABLET | Refills: 2 | Status: SHIPPED | OUTPATIENT
Start: 2020-02-10 | End: 2020-04-09

## 2020-02-10 RX ORDER — PRAVASTATIN SODIUM 40 MG
TABLET ORAL
Qty: 30 TABLET | Refills: 3 | Status: SHIPPED | OUTPATIENT
Start: 2020-02-10 | End: 2020-05-11

## 2020-02-10 RX ORDER — ATENOLOL 100 MG/1
TABLET ORAL
Qty: 30 TABLET | Refills: 2 | Status: SHIPPED | OUTPATIENT
Start: 2020-02-10 | End: 2021-05-13

## 2020-02-10 RX ORDER — ALBUTEROL SULFATE 90 UG/1
2 AEROSOL, METERED RESPIRATORY (INHALATION) EVERY 6 HOURS PRN
Qty: 1 INHALER | Refills: 2 | Status: SHIPPED | OUTPATIENT
Start: 2020-02-10 | End: 2021-03-15

## 2020-02-10 ASSESSMENT — PATIENT HEALTH QUESTIONNAIRE - PHQ9
2. FEELING DOWN, DEPRESSED OR HOPELESS: 1
1. LITTLE INTEREST OR PLEASURE IN DOING THINGS: 0
SUM OF ALL RESPONSES TO PHQ QUESTIONS 1-9: 1
SUM OF ALL RESPONSES TO PHQ QUESTIONS 1-9: 1
SUM OF ALL RESPONSES TO PHQ9 QUESTIONS 1 & 2: 1

## 2020-02-21 ASSESSMENT — ENCOUNTER SYMPTOMS
ORTHOPNEA: 0
BLURRED VISION: 0
SHORTNESS OF BREATH: 0

## 2020-02-21 NOTE — PROGRESS NOTES
Tympanic membrane, ear canal and external ear normal.      Nose: Nose normal.      Mouth/Throat:      Mouth: Mucous membranes are moist.      Pharynx: Oropharynx is clear. Eyes:      Extraocular Movements: Extraocular movements intact. Conjunctiva/sclera: Conjunctivae normal.      Pupils: Pupils are equal, round, and reactive to light. Neck:      Musculoskeletal: Normal range of motion and neck supple. Cardiovascular:      Rate and Rhythm: Normal rate and regular rhythm. Pulmonary:      Effort: Pulmonary effort is normal.      Breath sounds: Normal breath sounds. Abdominal:      General: Bowel sounds are normal.      Palpations: Abdomen is soft. Musculoskeletal: Normal range of motion. Skin:     General: Skin is warm and dry. Neurological:      General: No focal deficit present. Mental Status: She is alert and oriented to person, place, and time. Psychiatric:         Mood and Affect: Mood normal.         Behavior: Behavior normal.         Thought Content: Thought content normal.         Judgment: Judgment normal.         Assessment/Plan:      1. Essential hypertension  - atenolol (TENORMIN) 100 MG tablet; take 1 tablet by mouth once daily  Dispense: 30 tablet; Refill: 2  - hydrochlorothiazide (HYDRODIURIL) 25 MG tablet; Take 1 tablet by mouth daily  Dispense: 30 tablet; Refill: 2    2. Encounter for screening mammogram for breast cancer  - [facility] Digital Screen Bilateral [[id number]]; Future    3. Pre-diabetes  - metFORMIN (GLUCOPHAGE) 500 MG tablet; Take 1 tablet by mouth daily (with breakfast)  Dispense: 30 tablet; Refill: 3  - Hemoglobin A1C; Future    4. Pure hypercholesterolemia  - Lipid, Fasting; Future  - pravastatin (PRAVACHOL) 40 MG tablet; take 1 tablet by mouth at bedtime  Dispense: 30 tablet; Refill: 3    5. Need for hepatitis C screening test  - Hepatitis C Antibody; Future    6.  Intermittent asthma, well controlled  - albuterol sulfate HFA (PROVENTIL HFA) 108 (90 Base) MCG/ACT

## 2020-04-09 RX ORDER — HYDROCHLOROTHIAZIDE 25 MG/1
TABLET ORAL
Qty: 30 TABLET | Refills: 0 | Status: SHIPPED | OUTPATIENT
Start: 2020-04-09 | End: 2020-05-04

## 2020-04-09 NOTE — TELEPHONE ENCOUNTER
Refill request for HCTZ. If appropriate please send medication(s) to patients pharmacy.     Next appt: 05/11/2020      Health Maintenance   Topic Date Due    HIV screen  04/12/1973    Shingles Vaccine (1 of 2) 04/12/2008    Cervical cancer screen  11/23/2018    Breast cancer screen  01/30/2019    Potassium monitoring  04/20/2019    Creatinine monitoring  04/20/2019    Flu vaccine (Season Ended) 09/01/2020    A1C test (Diabetic or Prediabetic)  02/10/2021    Lipid screen  02/10/2021    Colon cancer screen colonoscopy  03/16/2022    DTaP/Tdap/Td vaccine (2 - Td) 09/08/2024    Pneumococcal 0-64 years Vaccine  Completed    Hepatitis C screen  Completed    Hepatitis A vaccine  Aged Out    Hepatitis B vaccine  Aged Out    Hib vaccine  Aged Out    Meningococcal (ACWY) vaccine  Aged Out       Hemoglobin A1C (%)   Date Value   02/10/2020 5.8   05/30/2019 6.0   10/30/2017 5.8             ( goal A1C is < 7)   No results found for: LABMICR  LDL Cholesterol (mg/dL)   Date Value   02/10/2020 93       (goal LDL is <100)   BUN (mg/dL)   Date Value   04/20/2018 18     BP Readings from Last 3 Encounters:   02/10/20 139/74   05/30/19 (!) 151/85   04/20/18 128/78          (goal 120/80)          Patient Active Problem List:     Essential hypertension     Pure hypercholesterolemia     Mild intermittent asthma without complication     Pre-diabetes

## 2020-05-04 RX ORDER — HYDROCHLOROTHIAZIDE 25 MG/1
TABLET ORAL
Qty: 30 TABLET | Refills: 0 | Status: SHIPPED | OUTPATIENT
Start: 2020-05-04 | End: 2020-06-01

## 2020-05-11 RX ORDER — PRAVASTATIN SODIUM 40 MG
TABLET ORAL
Qty: 30 TABLET | Refills: 3 | Status: SHIPPED | OUTPATIENT
Start: 2020-05-11 | End: 2020-09-17

## 2020-05-11 NOTE — TELEPHONE ENCOUNTER
E-scribing request for pravastatin and metformin . Pt has future appt.        Health Maintenance   Topic Date Due    HIV screen  04/12/1973    Shingles Vaccine (1 of 2) 04/12/2008    Cervical cancer screen  11/23/2018    Breast cancer screen  01/30/2019    Potassium monitoring  04/20/2019    Creatinine monitoring  04/20/2019    Flu vaccine (Season Ended) 09/01/2020    A1C test (Diabetic or Prediabetic)  02/10/2021    Lipid screen  02/10/2021    Colon cancer screen colonoscopy  03/16/2022    DTaP/Tdap/Td vaccine (2 - Td) 09/08/2024    Pneumococcal 0-64 years Vaccine  Completed    Hepatitis C screen  Completed    Hepatitis A vaccine  Aged Out    Hepatitis B vaccine  Aged Out    Hib vaccine  Aged Out    Meningococcal (ACWY) vaccine  Aged Out             (applicable per patient's age: Cancer Screenings, Depression Screening, Fall Risk Screening, Immunizations)    Hemoglobin A1C (%)   Date Value   02/10/2020 5.8   05/30/2019 6.0   10/30/2017 5.8     LDL Cholesterol (mg/dL)   Date Value   02/10/2020 93     BUN (mg/dL)   Date Value   04/20/2018 18      (goal A1C is < 7)   (goal LDL is <100) need 30-50% reduction from baseline     BP Readings from Last 3 Encounters:   02/10/20 139/74   05/30/19 (!) 151/85   04/20/18 128/78    (goal /80)      All Future Testing planned in [facility]:  Lab Frequency Next Occurrence   LAURA Digital Screen Bilateral [[id number]] Once 05/26/2020       Next Visit Date:  Future Appointments   Date Time Provider Tessa Spencer   5/18/2020  9:20 AM Mik Howard APRN - CNP [facility] [address] [facility]            Patient Active Problem List:     Essential hypertension     Pure hypercholesterolemia     Mild intermittent asthma without complication     Pre-diabetes

## 2020-05-18 ENCOUNTER — OFFICE VISIT (OUTPATIENT)
Dept: INTERNAL MEDICINE | Age: 60
End: 2020-05-18

## 2020-05-18 VITALS
SYSTOLIC BLOOD PRESSURE: 165 MMHG | HEART RATE: 67 BPM | WEIGHT: 176.2 LBS | BODY MASS INDEX: 33.27 KG/M2 | DIASTOLIC BLOOD PRESSURE: 90 MMHG | HEIGHT: 61 IN | TEMPERATURE: 97.7 F

## 2020-05-18 PROCEDURE — 99214 OFFICE O/P EST MOD 30 MIN: CPT | Performed by: NURSE PRACTITIONER

## 2020-05-18 PROCEDURE — 99211 OFF/OP EST MAY X REQ PHY/QHP: CPT | Performed by: NURSE PRACTITIONER

## 2020-05-18 RX ORDER — NAPROXEN 500 MG/1
500 TABLET ORAL 2 TIMES DAILY
COMMUNITY
Start: 2020-04-06 | End: 2021-03-29 | Stop reason: SDUPTHER

## 2020-05-18 RX ORDER — BLOOD PRESSURE TEST KIT
KIT MISCELLANEOUS
Qty: 1 KIT | Refills: 0 | Status: SHIPPED | OUTPATIENT
Start: 2020-05-18 | End: 2020-07-17

## 2020-05-18 ASSESSMENT — ENCOUNTER SYMPTOMS
ORTHOPNEA: 0
SHORTNESS OF BREATH: 0
BLURRED VISION: 0

## 2020-05-18 NOTE — PATIENT INSTRUCTIONS
Return To Clinic 6/1/2020 for 2 week follow up. After Visit Summary given and reviewed. The medication list included in this document is our record of what you are currently taking, including any changes that were made at today's visit. If you find any differences when compared to your medications at home, or have any questions that were not answered at your visit, please contact the office. It is very important for your care that you keep your appointment. If for some reason you are unable to keep your appointment, it is equally important that you call our office at [phone] to cancel your appointment and reschedule. Failure to do so may result in your termination from our practice. An order for Blood Pressure Kit was given to patient. Patient was advised that this order must be taken to a [address] in order to receive product.  List of DME Suppliers was given to patient along with order.     -JENSEN Walters

## 2020-05-18 NOTE — PROGRESS NOTES
HYPERTENSION visit     BP Readings from Last 3 Encounters:   02/10/20 139/74   05/30/19 (!) 151/85   04/20/18 128/78       LDL Cholesterol (mg/dL)   Date Value   02/10/2020 93     HDL (mg/dL)   Date Value   02/10/2020 48     BUN (mg/dL)   Date Value   04/20/2018 18     CREATININE (mg/dL)   Date Value   04/20/2018 1.00 (H)     Glucose (mg/dL)   Date Value   04/20/2018 87   04/10/2012 92              Have you changed or started any medications since your last visit including any over-the-counter medicines, vitamins, or herbal medicines? no   Have you stopped taking any of your medications? Is so, why? -  no  Are you having any side effects from any of your medications? - no  How often do you miss doses of your medication? rare      Have you seen any other physician or provider since your last visit?  no   Have you had any other diagnostic tests since your last visit? yes - Labs   Have you been seen in the emergency room and/or had an admission in a hospital since we last saw you?  no   Have you had your routine dental cleaning in the past 6 months?  no     Do you have an active MyChart account? If no, what is the barrier?   No: Pending    Patient Care Team:  CASEY Birmingham CNP as PCP - General (Family Nurse Practitioner)  CASEY Birmingham CNP as PCP - [facility] Provider    Medical History Review  Past Medical, Family, and Social History reviewed and does not contribute to the patient presenting condition    Health Maintenance   Topic Date Due    HIV screen  04/12/1973    Shingles Vaccine (1 of 2) 04/12/2008    Cervical cancer screen  11/23/2018    Breast cancer screen  01/30/2019    Potassium monitoring  04/20/2019    Creatinine monitoring  04/20/2019    Flu vaccine (Season Ended) 09/01/2020    A1C test (Diabetic or Prediabetic)  02/10/2021    Lipid screen  02/10/2021    Colon cancer screen colonoscopy  03/16/2022    DTaP/Tdap/Td vaccine (2 - Td) 09/08/2024    Pneumococcal 0-64 years Vaccine  Completed    Hepatitis C screen  Completed    Hepatitis A vaccine  Aged Out    Hepatitis B vaccine  Aged Out    Hib vaccine  Aged Out    Meningococcal (ACWY) vaccine  Aged Out

## 2020-05-18 NOTE — PROGRESS NOTES
She has not had a previous visit with a dietitian. She participates in exercise intermittently. Her home blood glucose trend is fluctuating minimally. An ACE inhibitor/angiotensin II receptor blocker is not being taken. She does not see a podiatrist.Eye exam is not current. Review of Systems   Constitutional: Negative for malaise/fatigue. Eyes: Negative for blurred vision. Respiratory: Negative for shortness of breath. Cardiovascular: Negative for chest pain, palpitations, orthopnea and PND. Musculoskeletal: Negative for neck pain. Neurological: Negative for headaches. All other systems reviewed and are negative. Prior to Visit Medications    Medication Sig Taking?  Authorizing Provider   naproxen (NAPROSYN) 500 MG tablet Take 500 mg by mouth 2 times daily Yes Historical Provider, MD   Blood Pressure KIT Use to monitor blood pressure daily and as needed Yes CASEY Knight CNP   pravastatin (PRAVACHOL) 40 MG tablet take 1 tablet by mouth at bedtime Yes CASEY Knight CNP   metFORMIN (GLUCOPHAGE) 500 MG tablet take 1 tablet by mouth once daily with breakfast Yes CASEY Knight CNP   hydroCHLOROthiazide (HYDRODIURIL) 25 MG tablet take 1 tablet by mouth once daily Yes CASEY Knight CNP   albuterol sulfate HFA (PROVENTIL HFA) 108 (90 Base) MCG/ACT inhaler Inhale 2 puffs into the lungs every 6 hours as needed for Wheezing Yes CASEY Knight CNP   atenolol (TENORMIN) 100 MG tablet take 1 tablet by mouth once daily Yes CASEY Knight CNP        Social History     Tobacco Use    Smoking status: Current Every Day Smoker     Packs/day: 0.50     Years: 35.00     Pack years: 17.50     Types: Cigarettes    Smokeless tobacco: Never Used   Substance Use Topics    Alcohol use: No     Comment: recovering alcoholic        Vitals:    05/18/20 0910   BP: (!) 165/90  Comment: medications taken today   Site: Left Upper Arm   Position: Sitting   Cuff

## 2020-06-01 RX ORDER — HYDROCHLOROTHIAZIDE 25 MG/1
TABLET ORAL
Qty: 30 TABLET | Refills: 0 | Status: SHIPPED | OUTPATIENT
Start: 2020-06-01 | End: 2020-07-06

## 2020-06-01 NOTE — TELEPHONE ENCOUNTER
Request for Hydrochlorothiazide . Next Visit Date:  No future appointments.     Health Maintenance   Topic Date Due    HIV screen  04/12/1973    Shingles Vaccine (1 of 2) 04/12/2008    Cervical cancer screen  11/23/2018    Breast cancer screen  01/30/2019    Potassium monitoring  04/20/2019    Creatinine monitoring  04/20/2019    Flu vaccine (Season Ended) 09/01/2020    A1C test (Diabetic or Prediabetic)  02/10/2021    Lipid screen  02/10/2021    Colon cancer screen colonoscopy  03/16/2022    DTaP/Tdap/Td vaccine (2 - Td) 09/08/2024    Pneumococcal 0-64 years Vaccine  Completed    Hepatitis C screen  Completed    Hepatitis A vaccine  Aged Out    Hepatitis B vaccine  Aged Out    Hib vaccine  Aged Out    Meningococcal (ACWY) vaccine  Aged Out       Hemoglobin A1C (%)   Date Value   02/10/2020 5.8   05/30/2019 6.0   10/30/2017 5.8             ( goal A1C is < 7)   No results found for: LABMICR  LDL Cholesterol (mg/dL)   Date Value   02/10/2020 93       (goal LDL is <100)   BUN (mg/dL)   Date Value   04/20/2018 18     BP Readings from Last 3 Encounters:   05/18/20 (!) 165/90   02/10/20 139/74   05/30/19 (!) 151/85          (goal 120/80)    All Future Testing planned in [facility]  Lab Frequency Next Occurrence   LAURA Digital Screen Bilateral [[id number]] Once 05/26/2020         Patient Active Problem List:     Essential hypertension     Pure hypercholesterolemia     Mild intermittent asthma without complication     Pre-diabetes

## 2020-06-29 RX ORDER — HYDROCHLOROTHIAZIDE 25 MG/1
TABLET ORAL
Qty: 30 TABLET | Refills: 0 | OUTPATIENT
Start: 2020-06-29

## 2020-06-29 NOTE — TELEPHONE ENCOUNTER
Refill request for hydrochlorothiazide    Pt last seen may //2020       Health Maintenance   Topic Date Due    HIV screen  04/12/1973    Shingles Vaccine (1 of 2) 04/12/2008    Cervical cancer screen  11/23/2018    Breast cancer screen  01/30/2019    Potassium monitoring  04/20/2019    Creatinine monitoring  04/20/2019    Flu vaccine (Season Ended) 09/01/2020    A1C test (Diabetic or Prediabetic)  02/10/2021    Lipid screen  02/10/2021    Colon cancer screen colonoscopy  03/16/2022    DTaP/Tdap/Td vaccine (2 - Td) 09/08/2024    Pneumococcal 0-64 years Vaccine  Completed    Hepatitis C screen  Completed    Hepatitis A vaccine  Aged Out    Hepatitis B vaccine  Aged Out    Hib vaccine  Aged Out    Meningococcal (ACWY) vaccine  Aged Out             (applicable per patient's age: Cancer Screenings, Depression Screening, Fall Risk Screening, Immunizations)    Hemoglobin A1C (%)   Date Value   02/10/2020 5.8   05/30/2019 6.0   10/30/2017 5.8     LDL Cholesterol (mg/dL)   Date Value   02/10/2020 93     BUN (mg/dL)   Date Value   04/20/2018 18      (goal A1C is < 7)   (goal LDL is <100) need 30-50% reduction from baseline     BP Readings from Last 3 Encounters:   05/18/20 (!) 165/90   02/10/20 139/74   05/30/19 (!) 151/85    (goal /80)      All Future Testing planned in [facility]:  Lab Frequency Next Occurrence   LUARA Digital Screen Bilateral [[id number]] Once 09/10/2020       Next Visit Date:  No future appointments.          Patient Active Problem List:     Essential hypertension     Pure hypercholesterolemia     Mild intermittent asthma without complication     Pre-diabetes

## 2020-07-06 RX ORDER — HYDROCHLOROTHIAZIDE 25 MG/1
TABLET ORAL
Qty: 30 TABLET | Refills: 0 | Status: SHIPPED | OUTPATIENT
Start: 2020-07-06 | End: 2020-08-03

## 2020-07-06 NOTE — TELEPHONE ENCOUNTER
Request for HCTZ - med pended. Patient has appt 7/17.       Next Visit Date:  Future Appointments   Date Time Provider Tessa Spencer   7/17/2020 11:20 AM Patricia Matute APRN - CNP [address] [facility]   Topic Date Due    HIV screen  04/12/1973    Shingles Vaccine (1 of 2) 04/12/2008    Cervical cancer screen  11/23/2018    Breast cancer screen  01/30/2019    Potassium monitoring  04/20/2019    Creatinine monitoring  04/20/2019    Flu vaccine (1) 09/01/2020    A1C test (Diabetic or Prediabetic)  02/10/2021    Lipid screen  02/10/2021    Colon cancer screen colonoscopy  03/16/2022    DTaP/Tdap/Td vaccine (2 - Td) 09/08/2024    Pneumococcal 0-64 years Vaccine  Completed    Hepatitis C screen  Completed    Hepatitis A vaccine  Aged Out    Hepatitis B vaccine  Aged Out    Hib vaccine  Aged Out    Meningococcal (ACWY) vaccine  Aged Out       Hemoglobin A1C (%)   Date Value   02/10/2020 5.8   05/30/2019 6.0   10/30/2017 5.8             ( goal A1C is < 7)   No results found for: LABMICR  LDL Cholesterol (mg/dL)   Date Value   02/10/2020 93       (goal LDL is <100)   BUN (mg/dL)   Date Value   04/20/2018 18     BP Readings from Last 3 Encounters:   05/18/20 (!) 165/90   02/10/20 139/74   05/30/19 (!) 151/85          (goal 120/80)    All Future Testing planned in [facility]  Lab Frequency Next Occurrence   LAURA Digital Screen Bilateral [[id number]] Once 09/10/2020         Patient Active Problem List:     Essential hypertension     Pure hypercholesterolemia     Mild intermittent asthma without complication     Pre-diabetes

## 2020-07-17 ENCOUNTER — HOSPITAL ENCOUNTER (OUTPATIENT)
Age: 60
Setting detail: SPECIMEN
Discharge: HOME | End: 2020-07-17

## 2020-07-17 ENCOUNTER — OFFICE VISIT (OUTPATIENT)
Dept: INTERNAL MEDICINE | Age: 60
End: 2020-07-17

## 2020-07-17 VITALS
SYSTOLIC BLOOD PRESSURE: 174 MMHG | HEART RATE: 66 BPM | DIASTOLIC BLOOD PRESSURE: 93 MMHG | WEIGHT: 172.8 LBS | HEIGHT: 61 IN | BODY MASS INDEX: 32.62 KG/M2

## 2020-07-17 LAB
ABSOLUTE EOS #: 0.08 K/UL (ref 0–0.44)
ABSOLUTE IMMATURE GRANULOCYTE: 0.04 K/UL (ref 0–0.3)
ABSOLUTE LYMPH #: 3.41 K/UL (ref 1.1–3.7)
ABSOLUTE MONO #: 0.54 K/UL (ref 0.1–1.2)
ALBUMIN SERPL-MCNC: 4.6 G/DL (ref 3.5–5.2)
ALBUMIN/GLOBULIN RATIO: 1.8 (ref 1–2.5)
ALP BLD-CCNC: 54 U/L (ref 35–104)
ALT SERPL-CCNC: 15 U/L (ref 5–33)
ANION GAP SERPL CALCULATED.3IONS-SCNC: 19 MMOL/L (ref 9–17)
AST SERPL-CCNC: 19 U/L
BASOPHILS # BLD: 1 % (ref 0–2)
BASOPHILS ABSOLUTE: 0.05 K/UL (ref 0–0.2)
BILIRUB SERPL-MCNC: 0.49 MG/DL (ref 0.3–1.2)
BUN BLDV-MCNC: 20 MG/DL (ref 8–23)
BUN/CREAT BLD: ABNORMAL (ref 9–20)
CALCIUM SERPL-MCNC: 9.6 MG/DL (ref 8.6–10.4)
CHLORIDE BLD-SCNC: 104 MMOL/L (ref 98–107)
CO2: 25 MMOL/L (ref 20–31)
CREAT SERPL-MCNC: 0.9 MG/DL (ref 0.5–0.9)
DIFFERENTIAL TYPE: NORMAL
EOSINOPHILS RELATIVE PERCENT: 1 % (ref 1–4)
GFR AFRICAN AMERICAN: >60 ML/MIN
GFR NON-AFRICAN AMERICAN: >60 ML/MIN
GFR SERPL CREATININE-BSD FRML MDRD: ABNORMAL ML/MIN/{1.73_M2}
GFR SERPL CREATININE-BSD FRML MDRD: ABNORMAL ML/MIN/{1.73_M2}
GLUCOSE BLD-MCNC: 93 MG/DL (ref 70–99)
HCT VFR BLD CALC: 43.9 % (ref 36.3–47.1)
HEMOGLOBIN: 14.2 G/DL (ref 11.9–15.1)
HIV AG/AB: NONREACTIVE
IMMATURE GRANULOCYTES: 0 %
LYMPHOCYTES # BLD: 33 % (ref 24–43)
MCH RBC QN AUTO: 31.3 PG (ref 25.2–33.5)
MCHC RBC AUTO-ENTMCNC: 32.3 G/DL (ref 28.4–34.8)
MCV RBC AUTO: 96.7 FL (ref 82.6–102.9)
MONOCYTES # BLD: 5 % (ref 3–12)
NRBC AUTOMATED: 0 PER 100 WBC
PDW BLD-RTO: 12.9 % (ref 11.8–14.4)
PLATELET # BLD: 199 K/UL (ref 138–453)
PLATELET ESTIMATE: NORMAL
PMV BLD AUTO: 12.5 FL (ref 8.1–13.5)
POTASSIUM SERPL-SCNC: 4.2 MMOL/L (ref 3.7–5.3)
RBC # BLD: 4.54 M/UL (ref 3.95–5.11)
RBC # BLD: NORMAL 10*6/UL
SEG NEUTROPHILS: 60 % (ref 36–65)
SEGMENTED NEUTROPHILS ABSOLUTE COUNT: 6.13 K/UL (ref 1.5–8.1)
SODIUM BLD-SCNC: 148 MMOL/L (ref 135–144)
TOTAL PROTEIN: 7.2 G/DL (ref 6.4–8.3)
TSH SERPL DL<=0.05 MIU/L-ACNC: 1.9 MIU/L (ref 0.3–5)
WBC # BLD: 10.3 K/UL (ref 3.5–11.3)
WBC # BLD: NORMAL 10*3/UL

## 2020-07-17 PROCEDURE — 99214 OFFICE O/P EST MOD 30 MIN: CPT | Performed by: NURSE PRACTITIONER

## 2020-07-17 PROCEDURE — 99211 OFF/OP EST MAY X REQ PHY/QHP: CPT | Performed by: NURSE PRACTITIONER

## 2020-07-17 RX ORDER — BLOOD PRESSURE TEST KIT
KIT MISCELLANEOUS
Qty: 1 KIT | Refills: 0 | Status: SHIPPED | OUTPATIENT
Start: 2020-07-17

## 2020-07-17 ASSESSMENT — ENCOUNTER SYMPTOMS
ORTHOPNEA: 0
BLURRED VISION: 0
SHORTNESS OF BREATH: 0

## 2020-07-17 NOTE — PATIENT INSTRUCTIONS
Return To Clinic 10/9/2020 for 3 month follow up via video. After Visit Summary given and reviewed. The medication list included in this document is our record of what you are currently taking, including any changes that were made at today's visit. If you find any differences when compared to your medications at home, or have any questions that were not answered at your visit, please contact the office. It is very important for your care that you keep your appointment. If for some reason you are unable to keep your appointment, it is equally important that you call our office at [phone] to cancel your appointment and reschedule. Failure to do so may result in your termination from our practice. LABORATORY INSTRUCTIONS    Your doctor has ordered blood or urine testing. You can get this testing done at the Lab located on the first floor of the [facility], or at any other [facility]. Please stop at Main Registration, before going to the lab, as you must be registered first.     Please get this lab done before your next visit. You may eat or drink before this test.    Script for Shingrix was printed and given to patient. Patient was instructed to take script(s) to pharmacy to get filled. An order for Blood Pressure Kit was faxed to [facility]. Referral for OBGYN sent to [facility]. Specialty office will contact patient for appointment. A copy of referral with contact information and address given to patient.  Patient should contact specialist office if no contact has been made to patient within 1 week.     JENSEN Alejandra

## 2020-07-17 NOTE — PROGRESS NOTES
HYPERTENSION visit     BP Readings from Last 3 Encounters:   05/18/20 (!) 165/90   02/10/20 139/74   05/30/19 (!) 151/85       LDL Cholesterol (mg/dL)   Date Value   02/10/2020 93     HDL (mg/dL)   Date Value   02/10/2020 48     BUN (mg/dL)   Date Value   04/20/2018 18     CREATININE (mg/dL)   Date Value   04/20/2018 1.00 (H)     Glucose (mg/dL)   Date Value   04/20/2018 87   04/10/2012 92              Have you changed or started any medications since your last visit including any over-the-counter medicines, vitamins, or herbal medicines? no   Have you stopped taking any of your medications? Is so, why? -  no  Are you having any side effects from any of your medications? - no  How often do you miss doses of your medication? rare      Have you seen any other physician or provider since your last visit?  no   Have you had any other diagnostic tests since your last visit?  no   Have you been seen in the emergency room and/or had an admission in a hospital since we last saw you?  no   Have you had your routine dental cleaning in the past 6 months?  no     Do you have an active MyChart account? If no, what is the barrier?   No: Pending    Patient Care Team:  CASEY Moss CNP as PCP - General (Family Nurse Practitioner)  CASEY Moss CNP as PCP - [facility]    Medical History Review  Past Medical, Family, and Social History reviewed and does not contribute to the patient presenting condition    Health Maintenance   Topic Date Due    HIV screen  04/12/1973    Shingles Vaccine (1 of 2) 04/12/2008    Cervical cancer screen  11/23/2018    Breast cancer screen  01/30/2019    Potassium monitoring  04/20/2019    Creatinine monitoring  04/20/2019    Flu vaccine (1) 09/01/2020    A1C test (Diabetic or Prediabetic)  02/10/2021    Lipid screen  02/10/2021    Colon cancer screen colonoscopy  03/16/2022    DTaP/Tdap/Td vaccine (2 - Td) 09/08/2024    Pneumococcal 0-64 years Vaccine

## 2020-07-17 NOTE — PROGRESS NOTES
2020     Annie Monday (:  1960) is a 56 y.o. female, here for evaluation of the following medical concerns:    Hypertension   This is a chronic problem. The current episode started more than 1 year ago. The problem has been waxing and waning since onset. The problem is controlled. Pertinent negatives include no anxiety, blurred vision, chest pain, headaches, malaise/fatigue, neck pain, orthopnea, palpitations, peripheral edema, PND, shortness of breath or sweats. Agents associated with hypertension include NSAIDs. Risk factors for coronary artery disease include dyslipidemia, obesity, smoking/tobacco exposure, sedentary lifestyle and stress. Past treatments include beta blockers and diuretics. The current treatment provides moderate improvement. Compliance problems include diet and exercise. There is no history of angina, kidney disease, CAD/MI, CVA, heart failure, left ventricular hypertrophy, PVD or retinopathy. Review of Systems   Constitutional: Negative for malaise/fatigue. Eyes: Negative for blurred vision. Respiratory: Negative for shortness of breath. Cardiovascular: Negative for chest pain, palpitations, orthopnea and PND. Musculoskeletal: Negative for neck pain. Neurological: Negative for headaches. All other systems reviewed and are negative. Prior to Visit Medications    Medication Sig Taking? Authorizing Provider   zoster recombinant adjuvanted vaccine [facility]) 50 MCG/0.5ML SUSR injection Inject 0.5 mLs into the muscle once for 1 dose 50 MCG IM then repeat 2-6 months.  Yes CASEY Hallman CNP   Blood Pressure KIT Use to monitor blood pressure daily and as needed Yes CASEY Hallman CNP   hydroCHLOROthiazide (HYDRODIURIL) 25 MG tablet take 1 tablet by mouth once daily Yes CASEY Hallman CNP   naproxen (NAPROSYN) 500 MG tablet Take 500 mg by mouth 2 times daily Yes Historical Provider, MD   pravastatin (PRAVACHOL) 40 MG tablet take 1 tablet by mouth at bedtime Yes CASEY Sheehan CNP   metFORMIN (GLUCOPHAGE) 500 MG tablet take 1 tablet by mouth once daily with breakfast Yes CASEY Sheehan CNP   albuterol sulfate HFA (PROVENTIL HFA) 108 (90 Base) MCG/ACT inhaler Inhale 2 puffs into the lungs every 6 hours as needed for Wheezing Yes CASEY Sheehan CNP   atenolol (TENORMIN) 100 MG tablet take 1 tablet by mouth once daily Yes CASEY Sheehan CNP        Social History     Tobacco Use    Smoking status: Current Every Day Smoker     Packs/day: 0.50     Years: 35.00     Pack years: 17.50     Types: Cigarettes    Smokeless tobacco: Never Used   Substance Use Topics    Alcohol use: No     Comment: recovering alcoholic        Vitals:    07/17/20 1115 07/17/20 1146   BP: (!) 173/91  Comment: medication taken today (!) 174/93   Site: Left Upper Arm Left Upper Arm   Position: Sitting Sitting   Cuff Size: Medium Adult Medium Adult   Pulse: 68 66   Weight: 172 lb 12.8 oz (78.4 kg)    Height: 5' 1\" (1.549 m)      Estimated body mass index is 32.65 kg/m² as calculated from the following:    Height as of this encounter: 5' 1\" (1.549 m). Weight as of this encounter: 172 lb 12.8 oz (78.4 kg). Physical Exam  Vitals signs and nursing note reviewed. Constitutional:       General: She is not in acute distress. Appearance: Normal appearance. HENT:      Head: Normocephalic and atraumatic. Right Ear: Tympanic membrane, ear canal and external ear normal.      Left Ear: Tympanic membrane, ear canal and external ear normal.      Mouth/Throat:      Pharynx: Oropharynx is clear. Neck:      Musculoskeletal: Normal range of motion and neck supple. Thyroid: No thyromegaly. Cardiovascular:      Rate and Rhythm: Normal rate and regular rhythm. Pulmonary:      Effort: Pulmonary effort is normal.      Breath sounds: Normal breath sounds.    Abdominal:      General: Bowel sounds are normal.      Palpations: Abdomen is soft. Musculoskeletal: Normal range of motion. Lymphadenopathy:      Cervical: No cervical adenopathy. Skin:     General: Skin is warm and dry. Neurological:      General: No focal deficit present. Mental Status: She is alert and oriented to person, place, and time. Psychiatric:         Mood and Affect: Mood normal.         Behavior: Behavior normal.         Thought Content: Thought content normal.         Judgment: Judgment normal.         ASSESSMENT/PLAN:  1. Essential hypertension  - Comprehensive Metabolic Panel; Future  - Blood Pressure KIT; Use to monitor blood pressure daily and as needed  Dispense: 1 kit; Refill: 0    2. Need for prophylactic vaccination and inoculation against varicella  - zoster recombinant adjuvanted vaccine [facility]) 50 MCG/0.5ML SUSR injection; Inject 0.5 mLs into the muscle once for 1 dose 50 MCG IM then repeat 2-6 months. Dispense: 1 each; Refill: 1    3. Screening for cervical cancer  - [facility]    4. Screening for human immunodeficiency virus  - HIV Screen; Future    5. Screening for thyroid disorder  - TSH with Reflex; Future    6. Screening for iron deficiency anemia  - CBC Auto Differential; Future      Return in about 3 months (around 10/17/2020). An electronic signature was used to authenticate this note.     --Erin Avila, CASEY - CNP on 7/17/2020 at 4:03 PM

## 2020-08-03 RX ORDER — HYDROCHLOROTHIAZIDE 25 MG/1
TABLET ORAL
Qty: 30 TABLET | Refills: 3 | Status: SHIPPED | OUTPATIENT
Start: 2020-08-03 | End: 2020-11-30

## 2020-08-03 NOTE — TELEPHONE ENCOUNTER
Refill request for HCTZ. If appropriate please send medication(s) to patients pharmacy.     Next appt: 10/9/2020    Health Maintenance   Topic Date Due    Shingles Vaccine (1 of 2) 04/12/2008    Cervical cancer screen  11/23/2018    Breast cancer screen  01/30/2019    Flu vaccine (1) 09/01/2020    A1C test (Diabetic or Prediabetic)  02/10/2021    Lipid screen  02/10/2021    Potassium monitoring  07/17/2021    Creatinine monitoring  07/17/2021    Colon cancer screen colonoscopy  03/16/2022    DTaP/Tdap/Td vaccine (2 - Td) 09/08/2024    Pneumococcal 0-64 years Vaccine  Completed    Hepatitis C screen  Completed    HIV screen  Completed    Hepatitis A vaccine  Aged Out    Hepatitis B vaccine  Aged Out    Hib vaccine  Aged Out    Meningococcal (ACWY) vaccine  Aged Out       Hemoglobin A1C (%)   Date Value   02/10/2020 5.8   05/30/2019 6.0   10/30/2017 5.8             ( goal A1C is < 7)   No results found for: LABMICR  LDL Cholesterol (mg/dL)   Date Value   02/10/2020 93       (goal LDL is <100)   AST (U/L)   Date Value   07/17/2020 19     ALT (U/L)   Date Value   07/17/2020 15     BUN (mg/dL)   Date Value   07/17/2020 20     BP Readings from Last 3 Encounters:   07/17/20 (!) 174/93   05/18/20 (!) 165/90   02/10/20 139/74          (goal 120/80)          Patient Active Problem List:     Essential hypertension     Pure hypercholesterolemia     Mild intermittent asthma without complication     Pre-diabetes

## 2020-09-17 RX ORDER — PRAVASTATIN SODIUM 40 MG
TABLET ORAL
Qty: 30 TABLET | Refills: 3 | Status: SHIPPED | OUTPATIENT
Start: 2020-09-17 | End: 2021-05-04

## 2020-09-17 NOTE — TELEPHONE ENCOUNTER
Refill request for Pravastatin and Metformin. If appropriate please send medication(s) to patients pharmacy.     Next appt: 10/9/2020      Health Maintenance   Topic Date Due    Shingles Vaccine (1 of 2) 04/12/2008    Cervical cancer screen  11/23/2018    Breast cancer screen  01/30/2019    Flu vaccine (1) 09/01/2020    A1C test (Diabetic or Prediabetic)  02/10/2021    Lipid screen  02/10/2021    Potassium monitoring  07/17/2021    Creatinine monitoring  07/17/2021    Colon cancer screen colonoscopy  03/16/2022    DTaP/Tdap/Td vaccine (2 - Td) 09/08/2024    Pneumococcal 0-64 years Vaccine  Completed    Hepatitis C screen  Completed    HIV screen  Completed    Hepatitis A vaccine  Aged Out    Hepatitis B vaccine  Aged Out    Hib vaccine  Aged Out    Meningococcal (ACWY) vaccine  Aged Out       Hemoglobin A1C (%)   Date Value   02/10/2020 5.8   05/30/2019 6.0   10/30/2017 5.8             ( goal A1C is < 7)   No results found for: LABMICR  LDL Cholesterol (mg/dL)   Date Value   02/10/2020 93       (goal LDL is <100)   AST (U/L)   Date Value   07/17/2020 19     ALT (U/L)   Date Value   07/17/2020 15     BUN (mg/dL)   Date Value   07/17/2020 20     BP Readings from Last 3 Encounters:   07/17/20 (!) 174/93   05/18/20 (!) 165/90   02/10/20 139/74          (goal 120/80)          Patient Active Problem List:     Essential hypertension     Pure hypercholesterolemia     Mild intermittent asthma without complication     Pre-diabetes

## 2020-11-30 RX ORDER — HYDROCHLOROTHIAZIDE 25 MG/1
TABLET ORAL
Qty: 30 TABLET | Refills: 3 | Status: SHIPPED | OUTPATIENT
Start: 2020-11-30 | End: 2021-04-06

## 2020-11-30 NOTE — TELEPHONE ENCOUNTER
Request for Hydrochlorothiazide. Next Visit Date:  No future appointments.     Health Maintenance   Topic Date Due    Shingles Vaccine (1 of 2) 04/12/2008    Cervical cancer screen  11/23/2018    Breast cancer screen  01/30/2019    Flu vaccine (1) 09/01/2020    A1C test (Diabetic or Prediabetic)  02/10/2021    Lipid screen  02/10/2021    Potassium monitoring  07/17/2021    Creatinine monitoring  07/17/2021    Colon cancer screen colonoscopy  03/16/2022    DTaP/Tdap/Td vaccine (2 - Td) 09/08/2024    Pneumococcal 0-64 years Vaccine  Completed    Hepatitis C screen  Completed    HIV screen  Completed    Hepatitis A vaccine  Aged Out    Hepatitis B vaccine  Aged Out    Hib vaccine  Aged Out    Meningococcal (ACWY) vaccine  Aged Out       Hemoglobin A1C (%)   Date Value   02/10/2020 5.8   05/30/2019 6.0   10/30/2017 5.8             ( goal A1C is < 7)   No results found for: LABMICR  LDL Cholesterol (mg/dL)   Date Value   02/10/2020 93       (goal LDL is <100)   AST (U/L)   Date Value   07/17/2020 19     ALT (U/L)   Date Value   07/17/2020 15     BUN (mg/dL)   Date Value   07/17/2020 20     BP Readings from Last 3 Encounters:   07/17/20 (!) 174/93   05/18/20 (!) 165/90   02/10/20 139/74          (goal 120/80)    All Future Testing planned in [facility]  Lab Frequency Next Occurrence   LAURA Digital Screen Bilateral [[id number]] Once 12/31/2020         Patient Active Problem List:     Essential hypertension     Pure hypercholesterolemia     Mild intermittent asthma without complication     Pre-diabetes

## 2021-01-30 DIAGNOSIS — R73.03 PRE-DIABETES: ICD-10-CM

## 2021-02-01 NOTE — TELEPHONE ENCOUNTER
Refill request for Metformin. If appropriate please send medication(s) to patients pharmacy.     Next appt: 3/1/2021      Health Maintenance   Topic Date Due    Shingles Vaccine (1 of 2) 04/12/2008    Cervical cancer screen  11/23/2018    Breast cancer screen  01/30/2019    Flu vaccine (1) 09/01/2020    A1C test (Diabetic or Prediabetic)  02/10/2021    Lipid screen  02/10/2021    Potassium monitoring  07/17/2021    Creatinine monitoring  07/17/2021    Colon cancer screen colonoscopy  03/16/2022    DTaP/Tdap/Td vaccine (2 - Td) 09/08/2024    Pneumococcal 0-64 years Vaccine  Completed    Hepatitis C screen  Completed    HIV screen  Completed    Hepatitis A vaccine  Aged Out    Hepatitis B vaccine  Aged Out    Hib vaccine  Aged Out    Meningococcal (ACWY) vaccine  Aged Out       Hemoglobin A1C (%)   Date Value   02/10/2020 5.8   05/30/2019 6.0   10/30/2017 5.8             ( goal A1C is < 7)   No results found for: LABMICR  LDL Cholesterol (mg/dL)   Date Value   02/10/2020 93       (goal LDL is <100)   AST (U/L)   Date Value   07/17/2020 19     ALT (U/L)   Date Value   07/17/2020 15     BUN (mg/dL)   Date Value   07/17/2020 20     BP Readings from Last 3 Encounters:   07/17/20 (!) 174/93   05/18/20 (!) 165/90   02/10/20 139/74          (goal 120/80)          Patient Active Problem List:     Essential hypertension     Pure hypercholesterolemia     Mild intermittent asthma without complication     Pre-diabetes

## 2021-03-01 ENCOUNTER — NURSE TRIAGE (OUTPATIENT)
Dept: OTHER | Facility: CLINIC | Age: 61
End: 2021-03-01

## 2021-03-01 NOTE — TELEPHONE ENCOUNTER
Reason for Disposition   Numbness or tingling in one or both feet is a chronic symptom (recurrent or ongoing problem lasting > 4 weeks)    Answer Assessment - Initial Assessment Questions  1. SYMPTOM: \"What is the main symptom you are concerned about? \" (e.g., weakness, numbness)      Tingling/pain/numbness in right leg - pain and a little numb    2. ONSET: \"When did this start? \" (minutes, hours, days; while sleeping)      About a year ago    3. LAST NORMAL: \"When was the last time you were normal (no symptoms)? \"      1 year ago    4. PATTERN \"Does this come and go, or has it been constant since it started? \"  \"Is it present now? \"      Comes and goes    5. CARDIAC SYMPTOMS: \"Have you had any of the following symptoms: chest pain, difficulty breathing, palpitations? \"      No    6. NEUROLOGIC SYMPTOMS: \"Have you had any of the following symptoms: headache, dizziness, vision loss, double vision, changes in speech, unsteady on your feet? \"      No    7. OTHER SYMPTOMS: \"Do you have any other symptoms? \"      Arms feel heavy    8. PREGNANCY: \"Is there any chance you are pregnant? \" \"When was your last menstrual period? \"      N/A    Protocols used: NEUROLOGIC DEFICIT-ADULT-OH    Patient called Nidia Bar at [facility] pre-service center [facility])  with red flag complaint. Brief description of triage: Pain and numbness in right leg. Right now no pain, just numbness to lateral thigh. Triage indicates for patient to be seen within 3 days. Care advice provided, patient verbalizes understanding; denies any other questions or concerns; instructed to call back for any new or worsening symptoms. Writer provided warm transfer to [place] at [facility] for appointment scheduling. Attention Provider: Thank you for allowing me to participate in the care of your patient. The patient was connected to triage in response to information provided to the [facility].   Please do not respond through this encounter as the response is not directed to a shared pool.

## 2021-03-12 DIAGNOSIS — J45.20 INTERMITTENT ASTHMA, WELL CONTROLLED: ICD-10-CM

## 2021-03-12 NOTE — TELEPHONE ENCOUNTER
Albuterol inhaler     Pt has future appointment           Next Visit Date:  Future Appointments   Date Time Provider Tessa Spencer   3/29/2021  1:20 PM Arnie Never, APRN - CNP [address] [facility]   Topic Date Due    COVID-19 Vaccine (1) Never done    Shingles Vaccine (1 of 2) Never done    Cervical cancer screen  11/23/2018    Breast cancer screen  01/30/2019    Flu vaccine (1) 09/01/2020    A1C test (Diabetic or Prediabetic)  02/10/2021    Lipid screen  02/10/2021    Potassium monitoring  07/17/2021    Creatinine monitoring  07/17/2021    Colon cancer screen colonoscopy  03/16/2022    DTaP/Tdap/Td vaccine (2 - Td) 09/08/2024    Pneumococcal 0-64 years Vaccine  Completed    Hepatitis C screen  Completed    HIV screen  Completed    Hepatitis A vaccine  Aged Out    Hepatitis B vaccine  Aged Out    Hib vaccine  Aged Out    Meningococcal (ACWY) vaccine  Aged Out       Hemoglobin A1C (%)   Date Value   02/10/2020 5.8   05/30/2019 6.0   10/30/2017 5.8             ( goal A1C is < 7)   No results found for: LABMICR  LDL Cholesterol (mg/dL)   Date Value   02/10/2020 93   09/08/2014 90       (goal LDL is <100)   AST (U/L)   Date Value   07/17/2020 19     ALT (U/L)   Date Value   07/17/2020 15     BUN (mg/dL)   Date Value   07/17/2020 20     BP Readings from Last 3 Encounters:   07/17/20 (!) 174/93   05/18/20 (!) 165/90   02/10/20 139/74          (goal 120/80)    All Future Testing planned in [facility]              Patient Active Problem List:     Essential hypertension     Pure hypercholesterolemia     Mild intermittent asthma without complication     Pre-diabetes

## 2021-03-15 RX ORDER — ALBUTEROL SULFATE 90 UG/1
AEROSOL, METERED RESPIRATORY (INHALATION)
Qty: 1 INHALER | Refills: 0 | Status: SHIPPED | OUTPATIENT
Start: 2021-03-15 | End: 2021-06-10

## 2021-03-29 ENCOUNTER — HOSPITAL ENCOUNTER (OUTPATIENT)
Age: 61
Setting detail: SPECIMEN
Discharge: HOME | End: 2021-03-29

## 2021-03-29 ENCOUNTER — OFFICE VISIT (OUTPATIENT)
Dept: INTERNAL MEDICINE | Age: 61
End: 2021-03-29

## 2021-03-29 VITALS
SYSTOLIC BLOOD PRESSURE: 151 MMHG | HEART RATE: 76 BPM | TEMPERATURE: 97.2 F | BODY MASS INDEX: 31.19 KG/M2 | DIASTOLIC BLOOD PRESSURE: 88 MMHG | HEIGHT: 61 IN | WEIGHT: 165.2 LBS

## 2021-03-29 DIAGNOSIS — M54.42 CHRONIC BILATERAL LOW BACK PAIN WITH LEFT-SIDED SCIATICA: ICD-10-CM

## 2021-03-29 DIAGNOSIS — F41.9 ANXIETY AND DEPRESSION: ICD-10-CM

## 2021-03-29 DIAGNOSIS — E78.00 PURE HYPERCHOLESTEROLEMIA: ICD-10-CM

## 2021-03-29 DIAGNOSIS — Z12.4 SCREENING FOR CERVICAL CANCER: ICD-10-CM

## 2021-03-29 DIAGNOSIS — G89.29 CHRONIC BILATERAL LOW BACK PAIN WITH LEFT-SIDED SCIATICA: ICD-10-CM

## 2021-03-29 DIAGNOSIS — F32.A ANXIETY AND DEPRESSION: ICD-10-CM

## 2021-03-29 DIAGNOSIS — R73.03 PRE-DIABETES: ICD-10-CM

## 2021-03-29 DIAGNOSIS — Z12.31 ENCOUNTER FOR SCREENING MAMMOGRAM FOR BREAST CANCER: ICD-10-CM

## 2021-03-29 DIAGNOSIS — J45.20 INTERMITTENT ASTHMA, WELL CONTROLLED: ICD-10-CM

## 2021-03-29 DIAGNOSIS — I10 ESSENTIAL HYPERTENSION: Primary | ICD-10-CM

## 2021-03-29 LAB
CHOLESTEROL/HDL RATIO: 4.6
CHOLESTEROL: 214 MG/DL
HDLC SERPL-MCNC: 47 MG/DL
LDL CHOLESTEROL: 114 MG/DL (ref 0–130)
TRIGL SERPL-MCNC: 265 MG/DL
VLDLC SERPL CALC-MCNC: ABNORMAL MG/DL (ref 1–30)

## 2021-03-29 PROCEDURE — 99214 OFFICE O/P EST MOD 30 MIN: CPT | Performed by: NURSE PRACTITIONER

## 2021-03-29 PROCEDURE — 99211 OFF/OP EST MAY X REQ PHY/QHP: CPT | Performed by: NURSE PRACTITIONER

## 2021-03-29 RX ORDER — NAPROXEN 500 MG/1
500 TABLET ORAL 2 TIMES DAILY WITH MEALS
Qty: 60 TABLET | Refills: 3 | Status: SHIPPED | OUTPATIENT
Start: 2021-03-29 | End: 2021-12-15 | Stop reason: SDUPTHER

## 2021-03-29 ASSESSMENT — PATIENT HEALTH QUESTIONNAIRE - PHQ9
10. IF YOU CHECKED OFF ANY PROBLEMS, HOW DIFFICULT HAVE THESE PROBLEMS MADE IT FOR YOU TO DO YOUR WORK, TAKE CARE OF THINGS AT HOME, OR GET ALONG WITH OTHER PEOPLE: 0
SUM OF ALL RESPONSES TO PHQ QUESTIONS 1-9: 17
7. TROUBLE CONCENTRATING ON THINGS, SUCH AS READING THE NEWSPAPER OR WATCHING TELEVISION: 3
5. POOR APPETITE OR OVEREATING: 1
2. FEELING DOWN, DEPRESSED OR HOPELESS: 3
8. MOVING OR SPEAKING SO SLOWLY THAT OTHER PEOPLE COULD HAVE NOTICED. OR THE OPPOSITE, BEING SO FIGETY OR RESTLESS THAT YOU HAVE BEEN MOVING AROUND A LOT MORE THAN USUAL: 1
SUM OF ALL RESPONSES TO PHQ QUESTIONS 1-9: 17

## 2021-03-29 ASSESSMENT — COLUMBIA-SUICIDE SEVERITY RATING SCALE - C-SSRS: 6. HAVE YOU EVER DONE ANYTHING, STARTED TO DO ANYTHING, OR PREPARED TO DO ANYTHING TO END YOUR LIFE?: NO

## 2021-03-29 NOTE — PATIENT INSTRUCTIONS
SCHEDULE A COVID VACCINE : [phone]    Return To Clinic 6/28/2021 for 3 month follow up in office. After Visit Summary given and reviewed. The medication list included in this document is our record of what you are currently taking, including any changes that were made at today's visit. If you find any differences when compared to your medications at home, or have any questions that were not answered at your visit, please contact the office. It is very important for your care that you keep your appointment. If for some reason you are unable to keep your appointment, it is equally important that you call our office at [phone] to cancel your appointment and reschedule. Failure to do so may result in your termination from our practice. Medications have been e-scribed to pharmacy of patients choice. LABORATORY INSTRUCTIONS    Your doctor has ordered blood or urine testing. You can get this testing done at the Lab located on the first floor of the [facility], or at any other [facility]. Please stop at Main Registration, before going to the lab, as you must be registered first.     Please get this lab done before your next visit. You may eat or drink before this test.    MAMMOGRAM INSTRUCTIONS    Your physician has ordered a mammogram for you. Mammography is an X-ray that creates an image of the breast.     To prepare yourself for the test shower, or bathe, as usual, but do not use any deodorants, powders, or perfumes under or around the breast or chest area. You will be called by the Scheduling Department to schedule your testing. If you do not hear from them within a week, please call them at [phone] to schedule the appointment. This will be done at any [facility] location of your choice. Report to the Admitting Department 20 minutes before the test to complete the proper paperwork.      If you cannot keep this appointment, please call [phone] to cancel and reschedule your appointment. X-RAY INSTRUCTIONS    Your doctor has ordered an X-Ray for you. This can be done at any [facility] without an appointment. [address] [facility] [address] [address]  [id number] FV [id number] [address] [facility] [address]    Report to the Admitting Department, located on the main floor of the medical center behind the information desk. Please remember to bring your X-Ray order with you. FOR THIS TESTING, YOU DO NOT NEED AN APPOINTMENT. Referral for OBGYN sent to [address] [place] [address] OBGYN. Specialty office will contact patient for appointment. A copy of referral with contact information and address given to patient.  Patient should contact specialist office if no contact has been made to patient within 1 week.     -JENSEN Walters

## 2021-03-30 LAB
ESTIMATED AVERAGE GLUCOSE: 117 MG/DL
HBA1C MFR BLD: 5.7 % (ref 4–6)

## 2021-04-03 DIAGNOSIS — I10 ESSENTIAL HYPERTENSION: ICD-10-CM

## 2021-04-05 NOTE — TELEPHONE ENCOUNTER
Pt has future appointment    Hydrochlorothiazide        Next Visit Date:  Future Appointments   Date Time Provider Tessa Spencer   6/28/2021  1:20 PM Doreen Guzmanr, APRN - CNP [facility]   Topic Date Due    COVID-19 Vaccine (1) Never done    Cervical cancer screen  11/23/2018    Breast cancer screen  01/30/2019    Shingles Vaccine (1 of 2) 09/29/2021 (Originally 4/12/2008)    Potassium monitoring  07/17/2021    Creatinine monitoring  07/17/2021    Flu vaccine (Season Ended) 09/01/2021    Colon cancer screen colonoscopy  03/16/2022    A1C test (Diabetic or Prediabetic)  03/29/2022    Lipid screen  03/29/2022    DTaP/Tdap/Td vaccine (2 - Td) 09/08/2024    Pneumococcal 0-64 years Vaccine  Completed    Hepatitis C screen  Completed    HIV screen  Completed    Hepatitis A vaccine  Aged Out    Hepatitis B vaccine  Aged Out    Hib vaccine  Aged Out    Meningococcal (ACWY) vaccine  Aged Out       Hemoglobin A1C (%)   Date Value   03/29/2021 5.7   02/10/2020 5.8   05/30/2019 6.0             ( goal A1C is < 7)   No results found for: LABMICR  LDL Cholesterol (mg/dL)   Date Value   03/29/2021 114   02/10/2020 93       (goal LDL is <100)   AST (U/L)   Date Value   07/17/2020 19     ALT (U/L)   Date Value   07/17/2020 15     BUN (mg/dL)   Date Value   07/17/2020 20     BP Readings from Last 3 Encounters:   03/29/21 (!) 151/88   07/17/20 (!) 174/93   05/18/20 (!) 165/90          (goal 120/80)    All Future Testing planned in [facility]  Lab Frequency Next Occurrence   LAURA Digital Screen Bilateral [[id number]] Once 04/28/2021   XR LUMBAR SPINE (2-3 VIEWS) Once 03/29/2021               Patient Active Problem List:     Essential hypertension     Pure hypercholesterolemia     Mild intermittent asthma without complication     Pre-diabetes

## 2021-04-06 RX ORDER — HYDROCHLOROTHIAZIDE 25 MG/1
TABLET ORAL
Qty: 30 TABLET | Refills: 3 | Status: SHIPPED | OUTPATIENT
Start: 2021-04-06 | End: 2021-05-13

## 2021-04-06 NOTE — PROGRESS NOTES
3/29/2021     Darrell James (:  1960) is a 56 y.o. female, here for evaluation of the following medical concerns:    HPI  Asthma:  Current treatment includes beta agonist inhalers, which has been effective. Using preventive medication(s) consistently: not applicable. Residual symptoms: none. Patient denies any other symptoms. She requires her rescue inhaler 2 time(s) per week. Chronic Back Pain: Pain is better. On average, pain is perceived as moderate (4-6 pain scale). Change in quality of symptoms: no.  Associated symptoms: none. She denies any other symptoms. Current treatment: NSAID- naproxen which has been  effective. Medication side effects: none. Recent diagnostic testing: none. Treatment Adherence:   Medication compliance:  compliant most of the time  Diet compliance:  compliant most of the time  Weight trend: stable  Current exercise: no regular exercise  Barriers: lack of motivation    Diabetes Mellitus Type 2: Current symptoms/problems include none. Home blood sugar records: patient does not test  Any episodes of hypoglycemia? no  Eye exam current (within one year): yes  Tobacco history: She  reports that she has been smoking cigarettes. She has a 17.50 pack-year smoking history. She has never used smokeless tobacco.   Daily Aspirin? No    Hypertension:  Home blood pressure monitoring: No.  She is not adherent to a low sodium diet. Patient denies chest pain, shortness of breath and palpitations. Antihypertensive medication side effects: no medication side effects noted. Use of agents associated with hypertension: none. Hyperlipidemia:  No new myalgias or GI upset on pravastatin (Pravachol).        Lab Results   Component Value Date    LABA1C 5.7 2021    LABA1C 5.8 02/10/2020    LABA1C 6.0 2019     Lab Results   Component Value Date    CREATININE 0.90 2020     Lab Results   Component Value Date    ALT 15 2020    AST 19 2020     Lab Results   Component Value Date    CHOL 214 (H) 03/29/2021    TRIG 265 (H) 03/29/2021    HDL 47 03/29/2021        Mood Disorder:  Patient presents for follow-up of depression and anxiety disorder. Current complaints include: depressed mood, difficulty concentrating, irritability and excessive worry. She denies increased use of drugs or alcohol and suicidal thoughts or behavior. Symptoms/signs of juan: none. External stressors: nothing new. Current treatment includes: Zoloft- 50 mg daily. Medication side effects: none. Review of Systems   All other systems reviewed and are negative. Prior to Visit Medications    Medication Sig Taking?  Authorizing Provider   naproxen (NAPROSYN) 500 MG tablet Take 1 tablet by mouth 2 times daily (with meals) Yes CASEY Helms CNP   sertraline (ZOLOFT) 50 MG tablet Take 1 tablet by mouth daily Yes CASEY Helms CNP   albuterol sulfate  (90 Base) MCG/ACT inhaler inhale 2 puffs by mouth every 6 hours if needed for wheezing Yes CASEY Helms CNP   metFORMIN (GLUCOPHAGE) 500 MG tablet take 1 tablet by mouth once daily with breakfast Yes CASEY Helms CNP   pravastatin (PRAVACHOL) 40 MG tablet take 1 tablet by mouth at bedtime Yes CASEY Helms CNP   Blood Pressure KIT Use to monitor blood pressure daily and as needed Yes CASEY Helms CNP   atenolol (TENORMIN) 100 MG tablet take 1 tablet by mouth once daily Yes CASEY Helms CNP   hydroCHLOROthiazide (HYDRODIURIL) 25 MG tablet take 1 tablet by mouth once daily  CASEY Helms CNP        Social History     Tobacco Use    Smoking status: Current Every Day Smoker     Packs/day: 0.50     Years: 35.00     Pack years: 17.50     Types: Cigarettes    Smokeless tobacco: Never Used   Substance Use Topics    Alcohol use: No     Comment: recovering alcoholic        Vitals:    03/29/21 1324 03/29/21 1345   BP: (!) 162/99  Comment: medication taken today (!) 151/88   Site: Left Upper Arm Left Upper Arm   Position: Sitting Sitting   Cuff Size: Medium Adult Medium Adult   Pulse: 83 76   Temp: 97.2 °F (36.2 °C)    TempSrc: Temporal    Weight: 165 lb 3.2 oz (74.9 kg)    Height: 5' 1\" (1.549 m)      Estimated body mass index is 31.21 kg/m² as calculated from the following:    Height as of this encounter: 5' 1\" (1.549 m). Weight as of this encounter: 165 lb 3.2 oz (74.9 kg). Physical Exam  Vitals signs and nursing note reviewed. Constitutional:       General: She is not in acute distress. Appearance: Normal appearance. HENT:      Head: Normocephalic and atraumatic. Right Ear: Tympanic membrane, ear canal and external ear normal.      Left Ear: Tympanic membrane, ear canal and external ear normal.      Nose: Nose normal.      Mouth/Throat:      Mouth: Mucous membranes are moist.      Pharynx: Oropharynx is clear. Eyes:      Extraocular Movements: Extraocular movements intact. Conjunctiva/sclera: Conjunctivae normal.      Pupils: Pupils are equal, round, and reactive to light. Neck:      Musculoskeletal: Normal range of motion and neck supple. Thyroid: No thyromegaly. Cardiovascular:      Rate and Rhythm: Normal rate and regular rhythm. Pulmonary:      Effort: Pulmonary effort is normal.   Abdominal:      General: Bowel sounds are normal.      Palpations: Abdomen is soft. Musculoskeletal: Normal range of motion. Lymphadenopathy:      Cervical: No cervical adenopathy. Skin:     General: Skin is warm and dry. Neurological:      General: No focal deficit present. Mental Status: She is alert and oriented to person, place, and time. Psychiatric:         Mood and Affect: Mood normal.         Behavior: Behavior normal.         Thought Content: Thought content normal.         ASSESSMENT/PLAN:  1. Essential hypertension  - continue present medications    2.  Encounter for screening mammogram for breast cancer  - LAURA Digital Screen Bilateral [id number]; Future    3. Pure hypercholesterolemia  - continue pravastatin  - Lipid Panel; Future    4. Chronic bilateral low back pain with left-sided sciatica  - naproxen (NAPROSYN) 500 MG tablet; Take 1 tablet by mouth 2 times daily (with meals)  Dispense: 60 tablet; Refill: 3  - XR LUMBAR SPINE (2-3 VIEWS); Future    5. Pre-diabetes  - continue metformin  - Hemoglobin A1C; Future    6. Screening for cervical cancer  - [facility]    7. Anxiety and depression  - sertraline (ZOLOFT) 50 MG tablet; Take 1 tablet by mouth daily  Dispense: 30 tablet; Refill: 3    8. Intermittent asthma, well controlled  - continue albuterol as neded    Return in about 3 months (around 6/29/2021). An electronic signature was used to authenticate this note.     --CASEY Deshpande - CNP on 4/6/2021 at 12:25 PM

## 2021-04-19 ENCOUNTER — TELEPHONE (OUTPATIENT)
Dept: INTERNAL MEDICINE | Age: 61
End: 2021-04-19

## 2021-05-04 DIAGNOSIS — E78.00 PURE HYPERCHOLESTEROLEMIA: ICD-10-CM

## 2021-05-04 RX ORDER — PRAVASTATIN SODIUM 40 MG
TABLET ORAL
Qty: 30 TABLET | Refills: 3 | Status: SHIPPED | OUTPATIENT
Start: 2021-05-04 | End: 2021-09-10

## 2021-05-04 NOTE — TELEPHONE ENCOUNTER
Refill request for pravastatin (PRAVACHOL) 40 MG tablet. If appropriate please send medication(s) to patients pharmacy.     Next appt: 6/28/2021      Health Maintenance   Topic Date Due    COVID-19 Vaccine (1) Never done    Cervical cancer screen  11/23/2018    Breast cancer screen  01/30/2019    Shingles Vaccine (1 of 2) 09/29/2021 (Originally 4/12/2008)    Potassium monitoring  07/17/2021    Creatinine monitoring  07/17/2021    Flu vaccine (Season Ended) 09/01/2021    Colon cancer screen colonoscopy  03/16/2022    A1C test (Diabetic or Prediabetic)  03/29/2022    Lipid screen  03/29/2022    DTaP/Tdap/Td vaccine (2 - Td) 09/08/2024    Pneumococcal 0-64 years Vaccine  Completed    Hepatitis C screen  Completed    HIV screen  Completed    Hepatitis A vaccine  Aged Out    Hepatitis B vaccine  Aged Out    Hib vaccine  Aged Out    Meningococcal (ACWY) vaccine  Aged Out       Hemoglobin A1C (%)   Date Value   03/29/2021 5.7   02/10/2020 5.8   05/30/2019 6.0             ( goal A1C is < 7)   No results found for: LABMICR  LDL Cholesterol (mg/dL)   Date Value   03/29/2021 114       (goal LDL is <100)   AST (U/L)   Date Value   07/17/2020 19     ALT (U/L)   Date Value   07/17/2020 15     BUN (mg/dL)   Date Value   07/17/2020 20     BP Readings from Last 3 Encounters:   03/29/21 (!) 151/88   07/17/20 (!) 174/93   05/18/20 (!) 165/90          (goal 120/80)          Patient Active Problem List:     Essential hypertension     Pure hypercholesterolemia     Mild intermittent asthma without complication     Pre-diabetes

## 2021-05-12 DIAGNOSIS — I10 ESSENTIAL HYPERTENSION: ICD-10-CM

## 2021-05-12 NOTE — TELEPHONE ENCOUNTER
Refill request for hydroCHLOROthiazide (HYDRODIURIL) 25 MG tablet and atenolol (TENORMIN) 100 MG tablet. If appropriate please send medication(s) to patients pharmacy.     Next appt: 6/28/2021      Health Maintenance   Topic Date Due    COVID-19 Vaccine (1) Never done    Cervical cancer screen  11/23/2018    Breast cancer screen  01/30/2019    Shingles Vaccine (1 of 2) 09/29/2021 (Originally 4/12/2008)    Potassium monitoring  07/17/2021    Creatinine monitoring  07/17/2021    Flu vaccine (Season Ended) 09/01/2021    Colon cancer screen colonoscopy  03/16/2022    A1C test (Diabetic or Prediabetic)  03/29/2022    Lipid screen  03/29/2022    DTaP/Tdap/Td vaccine (2 - Td) 09/08/2024    Pneumococcal 0-64 years Vaccine  Completed    Hepatitis C screen  Completed    HIV screen  Completed    Hepatitis A vaccine  Aged Out    Hepatitis B vaccine  Aged Out    Hib vaccine  Aged Out    Meningococcal (ACWY) vaccine  Aged Out       Hemoglobin A1C (%)   Date Value   03/29/2021 5.7   02/10/2020 5.8   05/30/2019 6.0             ( goal A1C is < 7)   No results found for: LABMICR  LDL Cholesterol (mg/dL)   Date Value   03/29/2021 114       (goal LDL is <100)   AST (U/L)   Date Value   07/17/2020 19     ALT (U/L)   Date Value   07/17/2020 15     BUN (mg/dL)   Date Value   07/17/2020 20     BP Readings from Last 3 Encounters:   03/29/21 (!) 151/88   07/17/20 (!) 174/93   05/18/20 (!) 165/90          (goal 120/80)          Patient Active Problem List:     Essential hypertension     Pure hypercholesterolemia     Mild intermittent asthma without complication     Pre-diabetes

## 2021-05-13 RX ORDER — HYDROCHLOROTHIAZIDE 25 MG/1
TABLET ORAL
Qty: 30 TABLET | Refills: 3 | Status: SHIPPED | OUTPATIENT
Start: 2021-05-13 | End: 2021-11-26 | Stop reason: SDUPTHER

## 2021-05-13 RX ORDER — ATENOLOL 100 MG/1
TABLET ORAL
Qty: 30 TABLET | Refills: 3 | Status: SHIPPED | OUTPATIENT
Start: 2021-05-13 | End: 2021-09-24 | Stop reason: SDUPTHER

## 2021-06-03 DIAGNOSIS — R73.03 PRE-DIABETES: ICD-10-CM

## 2021-06-03 NOTE — TELEPHONE ENCOUNTER
Refill request for metFORMIN (GLUCOPHAGE) 500 MG tablet. If appropriate please send medication(s) to patients pharmacy.     Next appt: 6/28/2021      Health Maintenance   Topic Date Due    COVID-19 Vaccine (1) Never done    Cervical cancer screen  11/23/2018    Breast cancer screen  01/30/2019    Shingles Vaccine (1 of 2) 09/29/2021 (Originally 4/12/2008)    Potassium monitoring  07/17/2021    Creatinine monitoring  07/17/2021    Flu vaccine (Season Ended) 09/01/2021    Colon cancer screen colonoscopy  03/16/2022    A1C test (Diabetic or Prediabetic)  03/29/2022    Lipid screen  03/29/2022    Pneumococcal 0-64 years Vaccine (2 of 2) 04/12/2023    DTaP/Tdap/Td vaccine (2 - Td or Tdap) 09/08/2024    Hepatitis C screen  Completed    HIV screen  Completed    Hepatitis A vaccine  Aged Out    Hepatitis B vaccine  Aged Out    Hib vaccine  Aged Out    Meningococcal (ACWY) vaccine  Aged Out       Hemoglobin A1C (%)   Date Value   03/29/2021 5.7   02/10/2020 5.8   05/30/2019 6.0             ( goal A1C is < 7)   No results found for: LABMICR  LDL Cholesterol (mg/dL)   Date Value   03/29/2021 114       (goal LDL is <100)   AST (U/L)   Date Value   07/17/2020 19     ALT (U/L)   Date Value   07/17/2020 15     BUN (mg/dL)   Date Value   07/17/2020 20     BP Readings from Last 3 Encounters:   03/29/21 (!) 151/88   07/17/20 (!) 174/93   05/18/20 (!) 165/90          (goal 120/80)          Patient Active Problem List:     Essential hypertension     Pure hypercholesterolemia     Mild intermittent asthma without complication     Pre-diabetes

## 2021-06-09 DIAGNOSIS — J45.20 INTERMITTENT ASTHMA, WELL CONTROLLED: ICD-10-CM

## 2021-06-10 RX ORDER — ALBUTEROL SULFATE 90 UG/1
AEROSOL, METERED RESPIRATORY (INHALATION)
Qty: 8.5 G | Refills: 3 | Status: SHIPPED | OUTPATIENT
Start: 2021-06-10 | End: 2022-02-24

## 2021-09-08 DIAGNOSIS — E78.00 PURE HYPERCHOLESTEROLEMIA: ICD-10-CM

## 2021-09-10 RX ORDER — PRAVASTATIN SODIUM 40 MG
TABLET ORAL
Qty: 120 TABLET | Refills: 0 | Status: SHIPPED | OUTPATIENT
Start: 2021-09-10 | End: 2021-12-15 | Stop reason: SDUPTHER

## 2021-09-24 DIAGNOSIS — I10 ESSENTIAL HYPERTENSION: ICD-10-CM

## 2021-09-24 NOTE — TELEPHONE ENCOUNTER
Refill request for Atenolol. If appropriate please send medication(s) to patients pharmacy.     Next appt: 10/12/2021 - Kortney Singleton  Last appt: 3/29/2021    Health Maintenance   Topic Date Due    COVID-19 Vaccine (1) Never done    Cervical cancer screen  Never done    Breast cancer screen  01/30/2019    Potassium monitoring  07/17/2021    Creatinine monitoring  07/17/2021    Flu vaccine (1) 09/01/2021    Shingles Vaccine (1 of 2) 09/29/2021 (Originally 4/12/2008)    Colon cancer screen colonoscopy  03/16/2022    A1C test (Diabetic or Prediabetic)  03/29/2022    Lipid screen  03/29/2022    Pneumococcal 0-64 years Vaccine (2 of 2 - PPSV23) 04/12/2023    DTaP/Tdap/Td vaccine (2 - Td or Tdap) 09/08/2024    Hepatitis C screen  Completed    HIV screen  Completed    Hepatitis A vaccine  Aged Out    Hepatitis B vaccine  Aged Out    Hib vaccine  Aged Out    Meningococcal (ACWY) vaccine  Aged Out       Hemoglobin A1C (%)   Date Value   03/29/2021 5.7   02/10/2020 5.8   05/30/2019 6.0             ( goal A1C is < 7)   No results found for: LABMICR  LDL Cholesterol (mg/dL)   Date Value   03/29/2021 114       (goal LDL is <100)   AST (U/L)   Date Value   07/17/2020 19     ALT (U/L)   Date Value   07/17/2020 15     BUN (mg/dL)   Date Value   07/17/2020 20     BP Readings from Last 3 Encounters:   03/29/21 (!) 151/88   07/17/20 (!) 174/93   05/18/20 (!) 165/90          (goal 120/80)          Patient Active Problem List:     Essential hypertension     Pure hypercholesterolemia     Mild intermittent asthma without complication     Pre-diabetes

## 2021-09-24 NOTE — TELEPHONE ENCOUNTER
----- Message from [facility] sent at 9/24/2021  8:18 AM EDT -----  Subject: Refill Request    QUESTIONS  Name of Medication? atenolol (TENORMIN) 100 MG tablet  Patient-reported dosage and instructions? take 1 tablet by mouth once   daily  How many days do you have left? 0  Preferred Pharmacy? [facility] phone number (if available)? [phone]  ---------------------------------------------------------------------------  --------------  CALL BACK INFO  What is the best way for the office to contact you? OK to leave message on   voicemail  Preferred Call Back Phone Number?  [phone]

## 2021-09-27 RX ORDER — ATENOLOL 100 MG/1
TABLET ORAL
Qty: 30 TABLET | Refills: 0 | Status: SHIPPED | OUTPATIENT
Start: 2021-09-27 | End: 2021-10-29

## 2021-09-29 DIAGNOSIS — R73.03 PRE-DIABETES: ICD-10-CM

## 2021-09-29 NOTE — TELEPHONE ENCOUNTER
Dr. Anabel Morataya, this is a former pt of [facility]. Pt is scheduled for NTP appt on 10/12/2021 with Dr. Aiyana Barajas. She called in and is reporting that she is out of metformin. I have pended a 30-day supply of metformin, once I see the medication has been approved I will call and let patient know this is a one-time refill and that she will not be able to get any further refills on medication here if she does not keep her NTP appt on the 12th. Thank you for your consideration. Request for metformin. Please review and e-scribe to pharmacy listed in chart if appropriate. Thank you.       Next Visit Date: 10/12/21 NTP w/Dr. Aiyana Baraajs, tx from [facility]  Last Visit Date: 3/29/2021    Future Appointments   Date Time Provider Tessa Spencer   10/12/2021  8:30 AM Hank Reid MD [facility] Maintenance   Topic Date Due    COVID-19 Vaccine (1) Never done    Cervical cancer screen  Never done    Breast cancer screen  01/30/2019    Potassium monitoring  07/17/2021    Creatinine monitoring  07/17/2021    Flu vaccine (1) 09/01/2021    Shingles Vaccine (1 of 2) 09/29/2021 (Originally 4/12/2008)    Colon cancer screen colonoscopy  03/16/2022    A1C test (Diabetic or Prediabetic)  03/29/2022    Lipid screen  03/29/2022    Pneumococcal 0-64 years Vaccine (2 of 2 - PPSV23) 04/12/2023    DTaP/Tdap/Td vaccine (2 - Td or Tdap) 09/08/2024    Hepatitis C screen  Completed    HIV screen  Completed    Hepatitis A vaccine  Aged Out    Hepatitis B vaccine  Aged Out    Hib vaccine  Aged Out    Meningococcal (ACWY) vaccine  Aged Out       Hemoglobin A1C (%)   Date Value   03/29/2021 5.7   02/10/2020 5.8   05/30/2019 6.0             ( goal A1C is < 7)   No results found for: LABMICR  LDL Cholesterol (mg/dL)   Date Value   03/29/2021 114       (goal LDL is <100)   AST (U/L)   Date Value   07/17/2020 19     ALT (U/L)   Date Value   07/17/2020 15     BUN (mg/dL)   Date Value   07/17/2020 20     BP Readings from Last 3 Encounters:   03/29/21 (!) 151/88   07/17/20 (!) 174/93   05/18/20 (!) 165/90          (goal 120/80)    All Future Testing planned in [facility]  Lab Frequency Next Occurrence   LAURA Digital Screen Bilateral [[id number]] Once 03/29/2022   XR LUMBAR SPINE (2-3 VIEWS) Once 10/28/2021         Patient Active Problem List:     Essential hypertension     Pure hypercholesterolemia     Mild intermittent asthma without complication     Pre-diabetes

## 2021-09-29 NOTE — TELEPHONE ENCOUNTER
----- Message from Joshua Borges sent at 9/29/2021  3:19 PM EDT -----  Subject: Refill Request    QUESTIONS  Name of Medication? metFORMIN (GLUCOPHAGE) 500 MG tablet  Patient-reported dosage and instructions? 1 per day   How many days do you have left? 0  Preferred Pharmacy? [facility] phone number (if available)? [phone]  Additional Information for Provider? This medication was requested by   Sarbjit Hancock to be filled 2 days ago (Per Sarbjit Hancock). She is completely out and   this is needed for her glucose. Please refill ASAP.   ---------------------------------------------------------------------------  --------------  CALL BACK INFO  What is the best way for the office to contact you? OK to leave message on   voicemail  Preferred Call Back Phone Number?  [phone]

## 2021-10-23 DIAGNOSIS — R73.03 PRE-DIABETES: ICD-10-CM

## 2021-10-23 DIAGNOSIS — I10 ESSENTIAL HYPERTENSION: ICD-10-CM

## 2021-10-28 NOTE — TELEPHONE ENCOUNTER
Refill request for metFORMIN (GLUCOPHAGE) 500 MG tablet. If appropriate please send medication(s) to patients pharmacy.     Next appt: 12/15/2021 - Popuri  Last appt: 3/29/2021    Health Maintenance   Topic Date Due    COVID-19 Vaccine (1) Never done    Cervical cancer screen  Never done    Shingles Vaccine (1 of 2) Never done    Breast cancer screen  01/30/2019    Potassium monitoring  07/17/2021    Creatinine monitoring  07/17/2021    Flu vaccine (1) 09/01/2021    Colon cancer screen colonoscopy  03/16/2022    A1C test (Diabetic or Prediabetic)  03/29/2022    Lipid screen  03/29/2022    Pneumococcal 0-64 years Vaccine (2 of 2 - PPSV23) 04/12/2023    DTaP/Tdap/Td vaccine (2 - Td or Tdap) 09/08/2024    Hepatitis C screen  Completed    HIV screen  Completed    Hepatitis A vaccine  Aged Out    Hepatitis B vaccine  Aged Out    Hib vaccine  Aged Out    Meningococcal (ACWY) vaccine  Aged Out       Hemoglobin A1C (%)   Date Value   03/29/2021 5.7   02/10/2020 5.8   05/30/2019 6.0             ( goal A1C is < 7)   No results found for: LABMICR  LDL Cholesterol (mg/dL)   Date Value   03/29/2021 114       (goal LDL is <100)   AST (U/L)   Date Value   07/17/2020 19     ALT (U/L)   Date Value   07/17/2020 15     BUN (mg/dL)   Date Value   07/17/2020 20     BP Readings from Last 3 Encounters:   03/29/21 (!) 151/88   07/17/20 (!) 174/93   05/18/20 (!) 165/90          (goal 120/80)          Patient Active Problem List:     Essential hypertension     Pure hypercholesterolemia     Mild intermittent asthma without complication     Pre-diabetes

## 2021-10-28 NOTE — TELEPHONE ENCOUNTER
Refill request for atenolol (TENORMIN) 100 MG tablet. If appropriate please send medication(s) to patients pharmacy.     Next appt: 12/15/2021 - Rogelio   Last appt: 3/29/2021    Health Maintenance   Topic Date Due    COVID-19 Vaccine (1) Never done    Cervical cancer screen  Never done    Shingles Vaccine (1 of 2) Never done    Breast cancer screen  01/30/2019    Potassium monitoring  07/17/2021    Creatinine monitoring  07/17/2021    Flu vaccine (1) 09/01/2021    Colon cancer screen colonoscopy  03/16/2022    A1C test (Diabetic or Prediabetic)  03/29/2022    Lipid screen  03/29/2022    Pneumococcal 0-64 years Vaccine (2 of 2 - PPSV23) 04/12/2023    DTaP/Tdap/Td vaccine (2 - Td or Tdap) 09/08/2024    Hepatitis C screen  Completed    HIV screen  Completed    Hepatitis A vaccine  Aged Out    Hepatitis B vaccine  Aged Out    Hib vaccine  Aged Out    Meningococcal (ACWY) vaccine  Aged Out       Hemoglobin A1C (%)   Date Value   03/29/2021 5.7   02/10/2020 5.8   05/30/2019 6.0             ( goal A1C is < 7)   No results found for: LABMICR  LDL Cholesterol (mg/dL)   Date Value   03/29/2021 114       (goal LDL is <100)   AST (U/L)   Date Value   07/17/2020 19     ALT (U/L)   Date Value   07/17/2020 15     BUN (mg/dL)   Date Value   07/17/2020 20     BP Readings from Last 3 Encounters:   03/29/21 (!) 151/88   07/17/20 (!) 174/93   05/18/20 (!) 165/90          (goal 120/80)          Patient Active Problem List:     Essential hypertension     Pure hypercholesterolemia     Mild intermittent asthma without complication     Pre-diabetes

## 2021-10-29 RX ORDER — ATENOLOL 100 MG/1
TABLET ORAL
Qty: 30 TABLET | Refills: 1 | Status: SHIPPED | OUTPATIENT
Start: 2021-10-29 | End: 2021-12-15 | Stop reason: ALTCHOICE

## 2021-10-29 NOTE — TELEPHONE ENCOUNTER
Medications refilled    Debbi Spear MD  Internal Medicine Resident, [facility];  [place], [place]  10/29/2021, 5:41 AM

## 2021-11-25 DIAGNOSIS — R73.03 PRE-DIABETES: ICD-10-CM

## 2021-11-26 DIAGNOSIS — I10 ESSENTIAL HYPERTENSION: ICD-10-CM

## 2021-11-26 RX ORDER — HYDROCHLOROTHIAZIDE 25 MG/1
TABLET ORAL
Qty: 30 TABLET | Refills: 3 | Status: SHIPPED | OUTPATIENT
Start: 2021-11-26 | End: 2021-12-15 | Stop reason: ALTCHOICE

## 2021-11-26 NOTE — TELEPHONE ENCOUNTER
Refill request for metFORMIN (GLUCOPHAGE) 500 MG tablet. If appropriate please send medication(s) to patients pharmacy.     Next appt: 12/15/2021 - Aquilinouri   Last appt: 3/29/2021 - [address] Maintenance   Topic Date Due    COVID-19 Vaccine (1) Never done    Cervical cancer screen  Never done    Shingles Vaccine (1 of 2) Never done    Breast cancer screen  01/30/2019    Potassium monitoring  07/17/2021    Creatinine monitoring  07/17/2021    Flu vaccine (1) 09/01/2021    Colon cancer screen colonoscopy  03/16/2022    A1C test (Diabetic or Prediabetic)  03/29/2022    Lipid screen  03/29/2022    Pneumococcal 0-64 years Vaccine (2 of 2 - PPSV23) 04/12/2023    DTaP/Tdap/Td vaccine (2 - Td or Tdap) 09/08/2024    Hepatitis C screen  Completed    HIV screen  Completed    Hepatitis A vaccine  Aged Out    Hepatitis B vaccine  Aged Out    Hib vaccine  Aged Out    Meningococcal (ACWY) vaccine  Aged Out       Hemoglobin A1C (%)   Date Value   03/29/2021 5.7   02/10/2020 5.8   05/30/2019 6.0             ( goal A1C is < 7)   No results found for: LABMICR  LDL Cholesterol (mg/dL)   Date Value   03/29/2021 114       (goal LDL is <100)   AST (U/L)   Date Value   07/17/2020 19     ALT (U/L)   Date Value   07/17/2020 15     BUN (mg/dL)   Date Value   07/17/2020 20     BP Readings from Last 3 Encounters:   03/29/21 (!) 151/88   07/17/20 (!) 174/93   05/18/20 (!) 165/90          (goal 120/80)          Patient Active Problem List:     Essential hypertension     Pure hypercholesterolemia     Mild intermittent asthma without complication     Pre-diabetes

## 2021-11-26 NOTE — TELEPHONE ENCOUNTER
Medication refilled . Mara Hunt MD  Internal Medicine Resident, PGY- [address] [place], [place]  11/26/2021, 11:37 AM

## 2021-11-26 NOTE — TELEPHONE ENCOUNTER
Medication refilled. Salomon Soulier, MD  Internal Medicine Resident, PGY- [address] [place], [place]  11/26/2021, 11:42 AM

## 2021-12-15 ENCOUNTER — OFFICE VISIT (OUTPATIENT)
Dept: INTERNAL MEDICINE | Age: 61
End: 2021-12-15

## 2021-12-15 VITALS
DIASTOLIC BLOOD PRESSURE: 88 MMHG | BODY MASS INDEX: 30.58 KG/M2 | HEIGHT: 61 IN | SYSTOLIC BLOOD PRESSURE: 157 MMHG | WEIGHT: 162 LBS | TEMPERATURE: 98.2 F | HEART RATE: 77 BPM

## 2021-12-15 DIAGNOSIS — K57.30 SIGMOID DIVERTICULOSIS: ICD-10-CM

## 2021-12-15 DIAGNOSIS — M54.42 CHRONIC BILATERAL LOW BACK PAIN WITH LEFT-SIDED SCIATICA: ICD-10-CM

## 2021-12-15 DIAGNOSIS — F17.210 SMOKING GREATER THAN 20 PACK YEARS: ICD-10-CM

## 2021-12-15 DIAGNOSIS — R73.03 PRE-DIABETES: ICD-10-CM

## 2021-12-15 DIAGNOSIS — Z23 NEED FOR PROPHYLACTIC VACCINATION AND INOCULATION AGAINST VARICELLA: ICD-10-CM

## 2021-12-15 DIAGNOSIS — G89.29 CHRONIC BILATERAL LOW BACK PAIN WITH LEFT-SIDED SCIATICA: ICD-10-CM

## 2021-12-15 DIAGNOSIS — I10 ESSENTIAL HYPERTENSION: Primary | ICD-10-CM

## 2021-12-15 DIAGNOSIS — E78.2 MIXED HYPERLIPIDEMIA: ICD-10-CM

## 2021-12-15 DIAGNOSIS — J45.20 MILD INTERMITTENT ASTHMA WITHOUT COMPLICATION: ICD-10-CM

## 2021-12-15 DIAGNOSIS — Z12.31 ENCOUNTER FOR SCREENING MAMMOGRAM FOR BREAST CANCER: ICD-10-CM

## 2021-12-15 LAB — HBA1C MFR BLD: 5.5 %

## 2021-12-15 PROCEDURE — 99211 OFF/OP EST MAY X REQ PHY/QHP: CPT | Performed by: INTERNAL MEDICINE

## 2021-12-15 PROCEDURE — 83036 HEMOGLOBIN GLYCOSYLATED A1C: CPT | Performed by: STUDENT IN AN ORGANIZED HEALTH CARE EDUCATION/TRAINING PROGRAM

## 2021-12-15 PROCEDURE — 99213 OFFICE O/P EST LOW 20 MIN: CPT | Performed by: STUDENT IN AN ORGANIZED HEALTH CARE EDUCATION/TRAINING PROGRAM

## 2021-12-15 RX ORDER — CYCLOBENZAPRINE HCL 10 MG
10 TABLET ORAL NIGHTLY PRN
Qty: 10 TABLET | Refills: 0 | Status: SHIPPED | OUTPATIENT
Start: 2021-12-15 | End: 2021-12-25

## 2021-12-15 RX ORDER — NAPROXEN 500 MG/1
500 TABLET ORAL PRN
Qty: 60 TABLET | Refills: 0 | Status: SHIPPED | OUTPATIENT
Start: 2021-12-15 | End: 2022-03-30 | Stop reason: SINTOL

## 2021-12-15 RX ORDER — ATENOLOL 100 MG/1
TABLET ORAL
Qty: 30 TABLET | Refills: 1 | Status: CANCELLED | OUTPATIENT
Start: 2021-12-15

## 2021-12-15 RX ORDER — PRAVASTATIN SODIUM 40 MG
TABLET ORAL
Qty: 120 TABLET | Refills: 3 | Status: SHIPPED | OUTPATIENT
Start: 2021-12-15

## 2021-12-15 RX ORDER — LISINOPRIL 10 MG/1
10 TABLET ORAL DAILY
Qty: 90 TABLET | Refills: 1 | Status: SHIPPED | OUTPATIENT
Start: 2021-12-15 | End: 2022-01-13 | Stop reason: SDUPTHER

## 2021-12-15 RX ORDER — LISINOPRIL AND HYDROCHLOROTHIAZIDE 25; 20 MG/1; MG/1
1 TABLET ORAL DAILY
Qty: 30 TABLET | Refills: 0 | Status: SHIPPED | OUTPATIENT
Start: 2021-12-15 | End: 2022-01-13

## 2021-12-15 SDOH — ECONOMIC STABILITY: FOOD INSECURITY: WITHIN THE PAST 12 MONTHS, THE FOOD YOU BOUGHT JUST DIDN'T LAST AND YOU DIDN'T HAVE MONEY TO GET MORE.: SOMETIMES TRUE

## 2021-12-15 SDOH — ECONOMIC STABILITY: FOOD INSECURITY: WITHIN THE PAST 12 MONTHS, YOU WORRIED THAT YOUR FOOD WOULD RUN OUT BEFORE YOU GOT MONEY TO BUY MORE.: SOMETIMES TRUE

## 2021-12-15 ASSESSMENT — ENCOUNTER SYMPTOMS
BLOOD IN STOOL: 0
BACK PAIN: 1
DIARRHEA: 0
WHEEZING: 1
CONSTIPATION: 0
SINUS PAIN: 0
VOMITING: 0
CHEST TIGHTNESS: 0
ALLERGIC/IMMUNOLOGIC NEGATIVE: 1
EYES NEGATIVE: 1
CHOKING: 0
SHORTNESS OF BREATH: 0
COUGH: 0
GASTROINTESTINAL NEGATIVE: 1
RECTAL PAIN: 0
SINUS PRESSURE: 0

## 2021-12-15 ASSESSMENT — SOCIAL DETERMINANTS OF HEALTH (SDOH): HOW HARD IS IT FOR YOU TO PAY FOR THE VERY BASICS LIKE FOOD, HOUSING, MEDICAL CARE, AND HEATING?: SOMEWHAT HARD

## 2021-12-15 NOTE — PROGRESS NOTES
[facility]  [facility] [address] [address]  Dept: [phone]  Dept Fax: [phone]    Office Progress/Follow Up Note  Date ofpatient's visit: 12/15/2021  Patient's Name:  Juliane Roy YOB: 1960            Patient Care Team:  Wyatt Lindsay MD as PCP - General (Internal Medicine)  ================================================================    REASON FOR VISIT/CHIEF COMPLAINT:  Establish Care and Health Maintenance (unable to do vaccines due to pt self pay insurance advised her to follow up with local pharmacy, pt does not have OBGYN pt would like to kiersten. pap, DMP pended)      HISTORY OF PRESENTING ILLNESS:  History was obtained from: patient, family member - sister at bedside and EHR. Maura Condon a 59 y.o. is here for establishing care with me   And health maintenance. Patient says she has been doing well apart from occasional low back pain on the left side with radiation to left thigh. It has been going on for the last few months and her work usually requires weightbearing activities and excess bending. She has been taking naproxen 5 mg twice daily as needed. Patient has also history of hypertension uncontrolled with systolic blood pressure readings often greater than 667 diastolic blood pressure less than 90 with normal heart rate. She does have blood pressure kit at home and she is not measuring readings daily and she has missed her morning medication today. Office visit blood pressure reading was 163/80 repeat was 157/88 she has lost 12 pounds over the last 1 year. She has been taking atenolol 100 mg, hydrochlorothiazide 25 mg with no significant side effects patient states she has been compliant with these medications. She often notes that her blood pressure readings at home were systolic [id number].     She also has history of prediabetes on Metformin 500 mg, compliant repeat A1c this office visit is 5.5 without any significant side effects. Patient has history of dyslipidemia last lipid panel showing elevated cholesterol and triglycerides she is on pravastatin 40 mg and states she is compliant with it. Repeat Lipid panel in the next office visit in 4 months from now. She has history of asthma controlled with albuterol as needed she says she does not usually require inhaler. Chronic smoking 20 pack years 1 pack of cigarette every 2 days for the last 40 years. Never had any PFT, denies any shortness of breath, chest pain on examination patient was found to be having some mild wheeze on bilateral lower lobes and middle lobes patient has been smoking. She is due for flu, shingles and Covid booster vaccine as she is self-pay today advised her to get it from outpatient pharmacy. Her last colonoscopy was in 2017 showing significant sigmoid diverticulosis advised patient not to take excess of nuts. Her PHQ 2 score is 0 and she was previously on Zoloft which she is not taking discontinue the medication this office visit. She is due for Pap smear will schedule an appointment in 4 weeks from now along side rechecking her blood pressure after starting lisinopril 20 and HCTZ  25  mg for controlling her blood pressure and will discontinue her atenolol 100 mg     No other complaints in this visit patient sounds understanding counseling for smoking cessation, dietary compliance and exercise has been advised to the patient.   Patient sounds understanding    Patient Active Problem List   Diagnosis    Essential hypertension    Mixed hyperlipidemia    Mild intermittent asthma without complication    Pre-diabetes    Smoking greater than 20 pack years    Sigmoid diverticulosis    Chronic bilateral low back pain with left-sided sciatica       Health Maintenance Due   Topic Date Due    Cervical cancer screen  Never done    Shingles Vaccine (1 of 2) Never done    Breast cancer screen  01/30/2019    Potassium monitoring 07/17/2021    Creatinine monitoring  07/17/2021    Flu vaccine (1) 09/01/2021    COVID-19 Vaccine (3 - Booster for Moderna series) 12/09/2021         No Known Allergies      Current Outpatient Medications   Medication Sig Dispense Refill    metFORMIN (GLUCOPHAGE) 500 MG tablet take 1 tablet by mouth EVERY MORNING WITH BREAKFAST 30 tablet 0    hydroCHLOROthiazide (HYDRODIURIL) 25 MG tablet take 1 tablet by mouth once daily 30 tablet 3    atenolol (TENORMIN) 100 MG tablet take 1 tablet by mouth once daily 30 tablet 1    pravastatin (PRAVACHOL) 40 MG tablet take 1 tablet by mouth at bedtime 120 tablet 0    albuterol sulfate  (90 Base) MCG/ACT inhaler inhale 2 puffs by mouth every 6 hours if needed for wheezing 8.5 g 3    naproxen (NAPROSYN) 500 MG tablet Take 1 tablet by mouth 2 times daily (with meals) 60 tablet 3    Blood Pressure KIT Use to monitor blood pressure daily and as needed 1 kit 0     No current facility-administered medications for this visit. Social History     Tobacco Use    Smoking status: Current Every Day Smoker     Packs/day: 0.50     Years: 35.00     Pack years: 17.50     Types: Cigarettes    Smokeless tobacco: Never Used   Vaping Use    Vaping Use: Never used   Substance Use Topics    Alcohol use: No     Comment: recovering alcoholic    Drug use: Yes     Comment: amari occassionally       Family History   Problem Relation Age of Onset    Heart Disease Mother     Lung Cancer Mother 54    Heart Disease Father     Stroke Father         REVIEW OF SYSTEMS:  Review of Systems   Constitutional: Negative for activity change, appetite change, chills, fever and unexpected weight change. HENT: Negative. Negative for sinus pressure and sinus pain. Eyes: Negative. Respiratory: Positive for wheezing. Negative for cough, choking, chest tightness and shortness of breath. Cardiovascular: Negative. Negative for chest pain and palpitations. Gastrointestinal: Negative. Negative for blood in stool, constipation, diarrhea, rectal pain and vomiting. Endocrine: Negative. Genitourinary: Negative. Musculoskeletal: Positive for arthralgias and back pain. Negative for gait problem, joint swelling, myalgias, neck pain and neck stiffness. Allergic/Immunologic: Negative. Neurological: Negative. Negative for tremors, syncope, speech difficulty and weakness. Hematological: Negative. Psychiatric/Behavioral: Negative. PHYSICAL EXAM:  Vitals:    12/15/21 1258 12/15/21 1303 12/15/21 1326   BP: (!) 164/78 (!) 163/80 (!) 157/88   Site: Left Upper Arm Left Upper Arm Left Upper Arm   Position: Sitting Sitting Sitting   Cuff Size: Medium Adult Medium Adult Medium Adult   Pulse: 78 77    Temp: 98.2 °F (36.8 °C)     Weight: 162 lb (73.5 kg)     Height: 5' 1\" (1.549 m)       BP Readings from Last 3 Encounters:   12/15/21 (!) 157/88   03/29/21 (!) 151/88   07/17/20 (!) 174/93        Physical Exam  Constitutional:       Appearance: She is obese. HENT:      Head: Normocephalic and atraumatic. Right Ear: Tympanic membrane normal.   Cardiovascular:      Rate and Rhythm: Normal rate and regular rhythm. Heart sounds: Normal heart sounds. Pulmonary:      Breath sounds: Wheezing present. Abdominal:      General: Abdomen is flat. Bowel sounds are normal.      Palpations: Abdomen is soft. Musculoskeletal:         General: Normal range of motion. Comments: Low back pain with mild radiation to left thigh occasionally. No paraspinal, spinal tenderness no signs of any weakness or muscle atrophy or trauma to the lower back. Neurological:      General: No focal deficit present. Mental Status: She is oriented to person, place, and time. Mental status is at baseline. Psychiatric:         Mood and Affect: Mood normal.         Behavior: Behavior normal.         Thought Content:  Thought content normal.           DIAGNOSTIC FINDINGS:  CBC:  Lab Results   Component Value Date    WBC 10.3 07/17/2020    HGB 14.2 07/17/2020     07/17/2020       BMP:    Lab Results   Component Value Date     07/17/2020    K 4.2 07/17/2020     07/17/2020    CO2 25 07/17/2020    BUN 20 07/17/2020    CREATININE 0.90 07/17/2020    GLUCOSE 93 07/17/2020    GLUCOSE 92 04/10/2012       HEMOGLOBIN A1C:   Lab Results   Component Value Date    LABA1C 5.7 03/29/2021       FASTING LIPID PANEL:  Lab Results   Component Value Date    CHOL 214 (H) 03/29/2021    HDL 47 03/29/2021    TRIG 265 (H) 03/29/2021       ASSESSMENT AND PLAN:  Latha Mccray was seen today for establish care and health maintenance. Diagnoses and all orders for this visit:    Essential hypertension  -     Basic Metabolic Panel; Future  -     CBC With Auto Differential; Future  -     lisinopril (PRINIVIL;ZESTRIL) 10 MG tablet; Take 1 tablet by mouth daily    Encounter for screening mammogram for breast cancer  -     [facility] DIGITAL SCREEN W OR WO CAD BILATERAL; Future    Chronic bilateral low back pain with left-sided sciatica  -     naproxen (NAPROSYN) 500 MG tablet; Take 1 tablet by mouth as needed for Pain  -     cyclobenzaprine (FLEXERIL) 10 MG tablet; Take 1 tablet by mouth nightly as needed for Muscle spasms    Mild intermittent asthma without complication  - continue albuterol and smoking sessatioo will upscale therapy in next office visit  Need for prophylactic vaccination and inoculation against varicella  - self pay pt wants to get from pharmacy    Pre-diabetes  -     metFORMIN (GLUCOPHAGE) 500 MG tablet; take 1 tablet by mouth EVERY MORNING WITH BREAKFAST  -     POCT glycosylated hemoglobin (Hb A1C)    Smoking greater than 20 pack years  - smoking sessation  Mixed hyperlipidemia  -     pravastatin (PRAVACHOL) 40 MG tablet; take 1 tablet by mouth at bedtime    Sigmoid diverticulosis    Other orders  -     lisinopril-hydroCHLOROthiazide (PRINZIDE;ZESTORETIC) 20-25 MG per tablet;  Take 1 tablet by mouth daily      zoloft

## 2021-12-15 NOTE — PATIENT INSTRUCTIONS
Medications e-scribe to pharmacy of pt's choice. Laboratory Instructions: Your doctor has ordered blood or urine testing. You can get this testing done at the Lab located on the first floor of the [facility], or at any other [facility]. Please stop at Main Registration, before going to the lab, as you must be registered first.   Please get this lab done before your next visit. You may eat or drink before this test.  Labs given to patient    Your physician has ordered a mammogram for you. Mammography is an X-ray that creates an image of the breast.   To prepare yourself for the test shower, or bathe, as usual, but do not use any deodorants, powders, or perfumes under or around the breast or chest area. You will be called by the Scheduling Department to schedule your testing. If you do not hear from them within a week, please call them at [phone] to schedule the appointment. This will be done at any [facility] location of your choice. Patient was given a mammogram order with instructions. Report to the Admitting Department 20 minutes before the test to complete the proper paperwork. If you cannot keep this appointment, please call [phone] to cancel and reschedule your appointment. Hypertension Instructions :     Learn to measure your own blood pressure. Keep a record Blood pressure log of your results and bring it to the office on next visit , we will go through the readings . Take your blood pressure medicine exactly as directed. Dont skip doses. Missing doses can cause your blood pressure to get out of control. Stay at a healthy weight. Get help to lose any extra pounds . Cut back on salt. ideal amount of sodium is no more than 1,500 mg a day. Follow the DASH (Dietary Approaches to Stop Hypertension) eating plan. This plan recommends vegetables, fruits, whole gains. Begin an exercise program. Simple activities such as walking can help.  Limit drinks that contain caffeine such as coffee, black or green tea, and cola to 2 per day. Limit alcohol intake if any. Call us right away if you have any of the following:  ? Chest pain or shortness of breath. ? Moderate to severe headache  ? Weakness in the muscles of your face, arms, or legs  ? Trouble speaking  ? Extreme drowsiness  ? Confusion  ? Fainting or dizziness  ? Pulsating or rushing sound in your ears  ? Unexplained nosebleed  ? Weakness, tingling, or numbness of your face, arms, or legs  ? Change in vision  ? Blood pressure measured at home that is greater than 180/110    Return To Clinic 1/12/2022. After Visit Summary  given and reviewed. --    It is very important for your care that you keep your appointment. If for some reason you are unable to keep your appointment it is equally important that you call our office at [phone] to cancel your appointment and reschedule. Failure to do so may result in your termination from our practice.

## 2021-12-18 DIAGNOSIS — I10 ESSENTIAL HYPERTENSION: ICD-10-CM

## 2021-12-20 NOTE — TELEPHONE ENCOUNTER
Request for Atenolol. The original prescription was discontinued on 12/15/2021 by Vasu Miller MD for the following reason: Therapy completed. Renewing this prescription may not be appropriate.     Next Visit Date:  Future Appointments   Date Time Provider Tessa Johni   1/12/2022  2:30 PM Wilver Barrios MD [facility] IM [address] Maintenance   Topic Date Due    Cervical cancer screen  Never done    Shingles Vaccine (1 of 2) Never done    Breast cancer screen  01/30/2019    Potassium monitoring  07/17/2021    Creatinine monitoring  07/17/2021    Flu vaccine (1) 09/01/2021    COVID-19 Vaccine (3 - Booster for Moderna series) 12/09/2021    Colon cancer screen colonoscopy  03/16/2022    Lipid screen  03/29/2022    A1C test (Diabetic or Prediabetic)  12/15/2022    Pneumococcal 0-64 years Vaccine (2 of 2 - PPSV23) 04/12/2023    DTaP/Tdap/Td vaccine (2 - Td or Tdap) 09/08/2024    Hepatitis C screen  Completed    HIV screen  Completed    Hepatitis A vaccine  Aged Out    Hepatitis B vaccine  Aged Out    Hib vaccine  Aged Out    Meningococcal (ACWY) vaccine  Aged Out       Hemoglobin A1C (%)   Date Value   12/15/2021 5.5   03/29/2021 5.7   02/10/2020 5.8             ( goal A1C is < 7)   No results found for: LABMICR  LDL Cholesterol (mg/dL)   Date Value   03/29/2021 114       (goal LDL is <100)   AST (U/L)   Date Value   07/17/2020 19     ALT (U/L)   Date Value   07/17/2020 15     BUN (mg/dL)   Date Value   07/17/2020 20     BP Readings from Last 3 Encounters:   12/15/21 (!) 157/88   03/29/21 (!) 151/88   07/17/20 (!) 174/93          (goal 120/80)    All Future Testing planned in [facility]  Lab Frequency Next Occurrence   LAURA Digital Screen Bilateral [[id number]] Once 03/29/2022   XR LUMBAR SPINE (2-3 VIEWS) Once 64/46/0917   Basic Metabolic Panel Once 92/45/1012   CBC With Auto Differential Once 12/15/2021   LAURA DIGITAL SCREEN W OR WO CAD BILATERAL Once 12/15/2021         Patient Active Problem List:     Essential hypertension     Mixed hyperlipidemia     Mild intermittent asthma without complication     Pre-diabetes     Smoking greater than 20 pack years     Sigmoid diverticulosis     Chronic bilateral low back pain with left-sided sciatica

## 2021-12-21 RX ORDER — ATENOLOL 100 MG/1
TABLET ORAL
Qty: 30 TABLET | Refills: 1 | OUTPATIENT
Start: 2021-12-21

## 2021-12-21 NOTE — TELEPHONE ENCOUNTER
Medication discontinued. Austin Mays MD  Internal Medicine Resident, [facility];  [place], [place]  12/21/2021, 8:34 AM

## 2022-01-13 DIAGNOSIS — I10 ESSENTIAL HYPERTENSION: Primary | ICD-10-CM

## 2022-01-13 RX ORDER — LISINOPRIL AND HYDROCHLOROTHIAZIDE 25; 20 MG/1; MG/1
TABLET ORAL
Qty: 30 TABLET | Refills: 0 | Status: SHIPPED | OUTPATIENT
Start: 2022-01-13 | End: 2022-02-16

## 2022-01-13 NOTE — TELEPHONE ENCOUNTER
Request for Lisinopril-HydroCHLOROthiazide       Next Visit Date:  02/02/2022  Future Appointments   Date Time Provider Tessa Spencer   2/2/2022  2:00 PM Salvatore Allan MD [facility] IM [address] Maintenance   Topic Date Due    Cervical cancer screen  Never done    Shingles Vaccine (1 of 2) Never done    Breast cancer screen  01/30/2019    Potassium monitoring  07/17/2021    Creatinine monitoring  07/17/2021    Flu vaccine (1) 09/01/2021    COVID-19 Vaccine (3 - Booster for Moderna series) 12/09/2021    Colon cancer screen colonoscopy  03/16/2022    Lipid screen  03/29/2022    Depression Monitoring  03/29/2022    A1C test (Diabetic or Prediabetic)  12/15/2022    Pneumococcal 0-64 years Vaccine (2 of 2 - PPSV23) 04/12/2023    DTaP/Tdap/Td vaccine (2 - Td or Tdap) 09/08/2024    Hepatitis C screen  Completed    HIV screen  Completed    Hepatitis A vaccine  Aged Out    Hepatitis B vaccine  Aged Out    Hib vaccine  Aged Out    Meningococcal (ACWY) vaccine  Aged Out       Hemoglobin A1C (%)   Date Value   12/15/2021 5.5   03/29/2021 5.7   02/10/2020 5.8             ( goal A1C is < 7)   No results found for: LABMICR  LDL Cholesterol (mg/dL)   Date Value   03/29/2021 114       (goal LDL is <100)   AST (U/L)   Date Value   07/17/2020 19     ALT (U/L)   Date Value   07/17/2020 15     BUN (mg/dL)   Date Value   07/17/2020 20     BP Readings from Last 3 Encounters:   12/15/21 (!) 157/88   03/29/21 (!) 151/88   07/17/20 (!) 174/93          (goal 120/80)    All Future Testing planned in [facility]  Lab Frequency Next Occurrence   LAURA Digital Screen Bilateral [[id number]] Once 03/29/2022   XR LUMBAR SPINE (2-3 VIEWS) Once 45/45/8277   Basic Metabolic Panel Once 48/92/3894   CBC With Auto Differential Once 03/23/2022   LAURA DIGITAL SCREEN W OR WO CAD BILATERAL Once 03/30/2022         Patient Active Problem List:     Essential hypertension     Mixed hyperlipidemia     Mild intermittent asthma without complication     Pre-diabetes     Smoking greater than 20 pack years     Sigmoid diverticulosis     Chronic bilateral low back pain with left-sided sciatica

## 2022-01-13 NOTE — TELEPHONE ENCOUNTER
Medication refilled for 30 days. Please do ask her to bring her home BP readings to office on 02/02/22. I discontinued lisinopril 10 and she is on lisinopril- HCTZ combination pill with 20-25. Call pharmacy to d/c lisinopril 10 and do update her to take only the combination pill and stop taking lisinopril 10 mg pill. Thank you. Chyna Kwok MD  Internal Medicine Resident, [facility];  [place], [place]  1/13/2022, 3:01 PM

## 2022-02-14 DIAGNOSIS — I10 ESSENTIAL HYPERTENSION: ICD-10-CM

## 2022-02-14 NOTE — TELEPHONE ENCOUNTER
Request for Lisinopril HCTZ.       Next Visit Date:  Future Appointments   Date Time Provider Tessa Johanna   3/9/2022  3:30 PM Penny Dillard MD [facility] IM [facility]   3/30/2022  3:30 PM Penny Dillard MD [facility] IM [address] Maintenance   Topic Date Due    Cervical cancer screen  Never done    Shingles Vaccine (1 of 2) Never done    Breast cancer screen  01/30/2019    Potassium monitoring  07/17/2021    Creatinine monitoring  07/17/2021    Flu vaccine (1) 09/01/2021    COVID-19 Vaccine (3 - Booster for Moderna series) 11/09/2021    Colon cancer screen colonoscopy  03/16/2022    Lipid screen  03/29/2022    Depression Monitoring  03/29/2022    A1C test (Diabetic or Prediabetic)  12/15/2022    Pneumococcal 0-64 years Vaccine (2 of 2 - PPSV23) 04/12/2023    DTaP/Tdap/Td vaccine (2 - Td or Tdap) 09/08/2024    Hepatitis C screen  Completed    HIV screen  Completed    Hepatitis A vaccine  Aged Out    Hepatitis B vaccine  Aged Out    Hib vaccine  Aged Out    Meningococcal (ACWY) vaccine  Aged Out       Hemoglobin A1C (%)   Date Value   12/15/2021 5.5   03/29/2021 5.7   02/10/2020 5.8             ( goal A1C is < 7)   No results found for: LABMICR  LDL Cholesterol (mg/dL)   Date Value   03/29/2021 114       (goal LDL is <100)   AST (U/L)   Date Value   07/17/2020 19     ALT (U/L)   Date Value   07/17/2020 15     BUN (mg/dL)   Date Value   07/17/2020 20     BP Readings from Last 3 Encounters:   12/15/21 (!) 157/88   03/29/21 (!) 151/88   07/17/20 (!) 174/93          (goal 120/80)    All Future Testing planned in [facility]  Lab Frequency Next Occurrence   LAURA Digital Screen Bilateral [[id number]] Once 03/29/2022   XR LUMBAR SPINE (2-3 VIEWS) Once 53/95/9917   Basic Metabolic Panel Once 65/06/9379   CBC With Auto Differential Once 03/23/2022   LAURA DIGITAL SCREEN W OR WO CAD BILATERAL Once 03/30/2022         Patient Active Problem List:     Essential hypertension     Mixed hyperlipidemia     Mild intermittent asthma without complication     Pre-diabetes     Smoking greater than 20 pack years     Sigmoid diverticulosis     Chronic bilateral low back pain with left-sided sciatica

## 2022-02-16 RX ORDER — LISINOPRIL AND HYDROCHLOROTHIAZIDE 25; 20 MG/1; MG/1
TABLET ORAL
Qty: 30 TABLET | Refills: 0 | Status: SHIPPED | OUTPATIENT
Start: 2022-02-16 | End: 2022-03-17

## 2022-02-16 NOTE — TELEPHONE ENCOUNTER
Has persistent uncontrolled hypertension and needs to be monitoring her home BP . Please do call her and ask her to bring log of BP reading and bring it to office next visit on 03/09/22. Medication refilled. Farrah Shaw MD  Internal Medicine Resident, [facility];  [place], [place]  2/16/2022, 6:32 PM

## 2022-02-17 NOTE — TELEPHONE ENCOUNTER
PC from patient - informed patient that medication was sent to pharmacy and to keep log of medication until appointment.

## 2022-02-23 DIAGNOSIS — J45.20 INTERMITTENT ASTHMA, WELL CONTROLLED: ICD-10-CM

## 2022-02-23 NOTE — TELEPHONE ENCOUNTER
Request for albuterol.       Next Visit Date:  Future Appointments   Date Time Provider Tessa Johni   3/30/2022  3:30 PM Kyler Molina MD [address] Maintenance   Topic Date Due    Cervical cancer screen  Never done    Shingles Vaccine (1 of 2) Never done    Breast cancer screen  01/30/2019    Potassium monitoring  07/17/2021    Creatinine monitoring  07/17/2021    Flu vaccine (1) 09/01/2021    COVID-19 Vaccine (3 - Booster for Moderna series) 11/09/2021    Colorectal Cancer Screen  03/16/2022    Lipid screen  03/29/2022    Depression Monitoring  03/29/2022    A1C test (Diabetic or Prediabetic)  12/15/2022    Pneumococcal 0-64 years Vaccine (2 of 2 - PPSV23) 04/12/2023    DTaP/Tdap/Td vaccine (2 - Td or Tdap) 09/08/2024    Hepatitis C screen  Completed    HIV screen  Completed    Hepatitis A vaccine  Aged Out    Hepatitis B vaccine  Aged Out    Hib vaccine  Aged Out    Meningococcal (ACWY) vaccine  Aged Out       Hemoglobin A1C (%)   Date Value   12/15/2021 5.5   03/29/2021 5.7   02/10/2020 5.8             ( goal A1C is < 7)   No results found for: LABMICR  LDL Cholesterol (mg/dL)   Date Value   03/29/2021 114       (goal LDL is <100)   AST (U/L)   Date Value   07/17/2020 19     ALT (U/L)   Date Value   07/17/2020 15     BUN (mg/dL)   Date Value   07/17/2020 20     BP Readings from Last 3 Encounters:   12/15/21 (!) 157/88   03/29/21 (!) 151/88   07/17/20 (!) 174/93          (goal 120/80)    All Future Testing planned in [facility]  Lab Frequency Next Occurrence   LAURA Digital Screen Bilateral [STH9050] Once 03/29/2022   XR LUMBAR SPINE (2-3 VIEWS) Once 24/66/1126   Basic Metabolic Panel Once 19/59/9985   CBC With Auto Differential Once 03/23/2022   LAURA DIGITAL SCREEN W OR WO CAD BILATERAL Once 03/30/2022         Patient Active Problem List:     Essential hypertension     Mixed hyperlipidemia     Mild intermittent asthma without complication     Pre-diabetes     Smoking greater than 20 pack years     Sigmoid diverticulosis     Chronic bilateral low back pain with left-sided sciatica

## 2022-02-24 RX ORDER — ALBUTEROL SULFATE 90 UG/1
AEROSOL, METERED RESPIRATORY (INHALATION)
Qty: 8.5 G | Refills: 3 | Status: SHIPPED | OUTPATIENT
Start: 2022-02-24

## 2022-03-09 ENCOUNTER — HOSPITAL ENCOUNTER (EMERGENCY)
Age: 62
Discharge: HOME | End: 2022-03-09
Attending: EMERGENCY MEDICINE

## 2022-03-09 VITALS
HEIGHT: 61 IN | RESPIRATION RATE: 19 BRPM | HEART RATE: 95 BPM | BODY MASS INDEX: 31.72 KG/M2 | OXYGEN SATURATION: 97 % | WEIGHT: 168 LBS | DIASTOLIC BLOOD PRESSURE: 91 MMHG | TEMPERATURE: 98.8 F | SYSTOLIC BLOOD PRESSURE: 168 MMHG

## 2022-03-09 DIAGNOSIS — K52.9 GASTROENTERITIS: Primary | ICD-10-CM

## 2022-03-09 LAB
-: ABNORMAL
ABSOLUTE EOS #: 0.17 K/UL (ref 0–0.44)
ABSOLUTE IMMATURE GRANULOCYTE: 0.04 K/UL (ref 0–0.3)
ABSOLUTE LYMPH #: 2.7 K/UL (ref 1.1–3.7)
ABSOLUTE MONO #: 0.76 K/UL (ref 0.1–1.2)
ALBUMIN SERPL-MCNC: 4.4 G/DL (ref 3.5–5.2)
ALBUMIN/GLOBULIN RATIO: 1.8 (ref 1–2.5)
ALP BLD-CCNC: 52 U/L (ref 35–104)
ALT SERPL-CCNC: 17 U/L (ref 5–33)
ANION GAP SERPL CALCULATED.3IONS-SCNC: 13 MMOL/L (ref 9–17)
AST SERPL-CCNC: 32 U/L
BACTERIA: ABNORMAL
BASOPHILS # BLD: 1 % (ref 0–2)
BASOPHILS ABSOLUTE: 0.06 K/UL (ref 0–0.2)
BILIRUB SERPL-MCNC: 0.44 MG/DL (ref 0.3–1.2)
BILIRUBIN URINE: NEGATIVE
BUN BLDV-MCNC: 16 MG/DL (ref 8–23)
CALCIUM SERPL-MCNC: 9.3 MG/DL (ref 8.6–10.4)
CASTS UA: ABNORMAL /LPF (ref 0–2)
CASTS UA: ABNORMAL /LPF (ref 0–2)
CHLORIDE BLD-SCNC: 103 MMOL/L (ref 98–107)
CO2: 24 MMOL/L (ref 20–31)
COLOR: YELLOW
CREAT SERPL-MCNC: 0.78 MG/DL (ref 0.5–0.9)
CRYSTALS, UA: ABNORMAL /HPF
CRYSTALS, UA: ABNORMAL /HPF
EOSINOPHILS RELATIVE PERCENT: 2 % (ref 1–4)
EPITHELIAL CELLS UA: ABNORMAL /HPF (ref 0–5)
GFR AFRICAN AMERICAN: >60 ML/MIN
GFR NON-AFRICAN AMERICAN: >60 ML/MIN
GFR SERPL CREATININE-BSD FRML MDRD: ABNORMAL ML/MIN/{1.73_M2}
GLUCOSE BLD-MCNC: 80 MG/DL (ref 70–99)
GLUCOSE URINE: NEGATIVE
HCT VFR BLD CALC: 39.4 % (ref 36.3–47.1)
HEMOGLOBIN: 13.1 G/DL (ref 11.9–15.1)
IMMATURE GRANULOCYTES: 0 %
KETONES, URINE: ABNORMAL
LEUKOCYTE ESTERASE, URINE: NEGATIVE
LYMPHOCYTES # BLD: 25 % (ref 24–43)
MAGNESIUM: 1.6 MG/DL (ref 1.6–2.6)
MCH RBC QN AUTO: 32 PG (ref 25.2–33.5)
MCHC RBC AUTO-ENTMCNC: 33.2 G/DL (ref 28.4–34.8)
MCV RBC AUTO: 96.3 FL (ref 82.6–102.9)
MONOCYTES # BLD: 7 % (ref 3–12)
MUCUS: ABNORMAL
NITRITE, URINE: NEGATIVE
NRBC AUTOMATED: 0 PER 100 WBC
PDW BLD-RTO: 13.8 % (ref 11.8–14.4)
PH UA: 5.5 (ref 5–8)
PLATELET # BLD: 274 K/UL (ref 138–453)
PMV BLD AUTO: 11.6 FL (ref 8.1–13.5)
POTASSIUM SERPL-SCNC: 3.7 MMOL/L (ref 3.7–5.3)
PROTEIN UA: ABNORMAL
RBC # BLD: 4.09 M/UL (ref 3.95–5.11)
RBC UA: ABNORMAL /HPF (ref 0–2)
SEG NEUTROPHILS: 65 % (ref 36–65)
SEGMENTED NEUTROPHILS ABSOLUTE COUNT: 6.89 K/UL (ref 1.5–8.1)
SODIUM BLD-SCNC: 140 MMOL/L (ref 135–144)
SPECIFIC GRAVITY UA: 1.03 (ref 1–1.03)
TOTAL PROTEIN: 6.9 G/DL (ref 6.4–8.3)
TURBIDITY: CLEAR
URINE HGB: NEGATIVE
UROBILINOGEN, URINE: NORMAL
WBC # BLD: 10.6 K/UL (ref 3.5–11.3)
WBC UA: ABNORMAL /HPF (ref 0–5)

## 2022-03-09 PROCEDURE — 81001 URINALYSIS AUTO W/SCOPE: CPT

## 2022-03-09 PROCEDURE — 85025 COMPLETE CBC W/AUTO DIFF WBC: CPT

## 2022-03-09 PROCEDURE — 83735 ASSAY OF MAGNESIUM: CPT

## 2022-03-09 PROCEDURE — 99281 EMR DPT VST MAYX REQ PHY/QHP: CPT

## 2022-03-09 PROCEDURE — 2580000003 HC RX 258: Performed by: STUDENT IN AN ORGANIZED HEALTH CARE EDUCATION/TRAINING PROGRAM

## 2022-03-09 PROCEDURE — 96374 THER/PROPH/DIAG INJ IV PUSH: CPT

## 2022-03-09 PROCEDURE — 93005 ELECTROCARDIOGRAM TRACING: CPT | Performed by: STUDENT IN AN ORGANIZED HEALTH CARE EDUCATION/TRAINING PROGRAM

## 2022-03-09 PROCEDURE — 80053 COMPREHEN METABOLIC PANEL: CPT

## 2022-03-09 PROCEDURE — 6360000002 HC RX W HCPCS: Performed by: STUDENT IN AN ORGANIZED HEALTH CARE EDUCATION/TRAINING PROGRAM

## 2022-03-09 RX ORDER — 0.9 % SODIUM CHLORIDE 0.9 %
1000 INTRAVENOUS SOLUTION INTRAVENOUS ONCE
Status: COMPLETED | OUTPATIENT
Start: 2022-03-09 | End: 2022-03-09

## 2022-03-09 RX ORDER — ONDANSETRON 2 MG/ML
4 INJECTION INTRAMUSCULAR; INTRAVENOUS ONCE
Status: COMPLETED | OUTPATIENT
Start: 2022-03-09 | End: 2022-03-09

## 2022-03-09 RX ORDER — ONDANSETRON 4 MG/1
4 TABLET, ORALLY DISINTEGRATING ORAL EVERY 8 HOURS PRN
Qty: 10 TABLET | Refills: 0 | Status: SHIPPED | OUTPATIENT
Start: 2022-03-09 | End: 2022-05-31

## 2022-03-09 RX ADMIN — ONDANSETRON 4 MG: 2 INJECTION INTRAMUSCULAR; INTRAVENOUS at 14:54

## 2022-03-09 RX ADMIN — SODIUM CHLORIDE 1000 ML: 9 INJECTION, SOLUTION INTRAVENOUS at 14:54

## 2022-03-09 ASSESSMENT — ENCOUNTER SYMPTOMS
PHOTOPHOBIA: 0
SINUS PRESSURE: 0
ABDOMINAL PAIN: 0
CHEST TIGHTNESS: 0
SINUS PAIN: 0
TROUBLE SWALLOWING: 0
COUGH: 0
CONSTIPATION: 0
BACK PAIN: 0
VOMITING: 1
SHORTNESS OF BREATH: 0
SORE THROAT: 0
EYE REDNESS: 0
COLOR CHANGE: 0
DIARRHEA: 1
NAUSEA: 1
BLOOD IN STOOL: 1

## 2022-03-09 NOTE — ED PROVIDER NOTES
[facility]     Emergency Department     Faculty Note/ Attestation      Pt Name: Jayna Koch                                       MRN: [id number]  [place] 1960  Date of evaluation: 3/9/2022    Patients PCP:    Sera Elias MD    Attestation  I performed a history and physical examination of the patient and discussed management with the resident. I reviewed the residents note and agree with the documented findings and plan of care. Any areas of disagreement are noted on the chart. I was personally present for the key portions of any procedures. I have documented in the chart those procedures where I was not present during the key portions. I have reviewed the emergency nurses triage note. I agree with the chief complaint, past medical history, past surgical history, allergies, medications, social and family history as documented unless otherwise noted below. For Physician Assistant/ Nurse Practitioner cases/documentation I have personally evaluated this patient and have completed at least one if not all key elements of the E/M (history, physical exam, and MDM). Additional findings are as noted. Initial Screens:        Olga Coma Scale  Eye Opening: Spontaneous  Best Verbal Response: Oriented  Best Motor Response: Obeys commands  Winona Coma Scale Score: 15    Vitals:    Vitals:    03/09/22 1433   BP: (!) 168/91   Pulse: 105   Resp: 16   Temp: 98.8 °F (37.1 °C)   SpO2: 98%   Weight: 168 lb (76.2 kg)   Height: 5' 1\" (1.549 m)       [address] [place] [address]       Chief Complaint   Patient presents with    Diarrhea     Patient reports diarrhea x4 days and reports having dark red stool. Notes taking immodium and pepto. The pt has severe diarrhea no fevers no red blood in the stool.         EMERGENCY DEPARTMENT COURSE:     -------------------------  BP: (!) 168/91, Temp: 98.8 °F (37.1 °C), Pulse: 105, Resp: 16  Physical Exam  Constitutional:       Appearance: She is well-developed. She is not diaphoretic. HENT:      Head: Normocephalic and atraumatic. Right Ear: External ear normal.      Left Ear: External ear normal.   Eyes:      General: No scleral icterus. Right eye: No discharge. Left eye: No discharge. Neck:      Trachea: No tracheal deviation. Pulmonary:      Effort: Pulmonary effort is normal. No respiratory distress. Breath sounds: No stridor. Musculoskeletal:         General: Normal range of motion. Cervical back: Normal range of motion. Skin:     General: Skin is warm and dry. Neurological:      Mental Status: She is alert and oriented to person, place, and time. Coordination: Coordination normal.   Psychiatric:         Behavior: Behavior normal.           Comments      ED Course as of 03/09/22 1534   Wed Mar 09, 2022   1530 Patient lab work within normal limits. Patient reevaluated states she is feeling much better after the Zofran. Patient requesting crackers in this ER minutes. [CD]   9903 Given strict return precautions, instructed to follow clear liquid diet over the next couple of days. Will write off for work given concerns for viral gastroenteritis [CD]      ED Course User Index  [CD] DO Lew Edge DO,, DO, RDMS.   Attending Emergency Physician          Lew Haile DO  03/09/22 1537

## 2022-03-09 NOTE — ED PROVIDER NOTES
[facility] ED  Emergency Department Encounter  Emergency Medicine Resident     Pt Name: Caridad Salazar  MRN: [id number]  [place] 1960  Date of evaluation: 3/9/22  PCP:  Natacha Kee MD    [address] [place] [address]       Chief Complaint   Patient presents with    Diarrhea     Patient reports diarrhea x4 days and reports having dark red stool. Notes taking immodium and pepto. HISTORY OFPRESENT ILLNESS  (Location/Symptom, Timing/Onset, Context/Setting, Quality, Duration, Modifying Factors,Severity.)      Caridad Salazar is a 59 y. o.yo female who presents with complaints of nausea vomiting and diarrhea for the last 2 days. Patient states it started with diarrhea and has progressed to nausea and vomiting. She states she has not been able to keep much of anything down today including her medications. Patient reports diffuse abdominal discomfort secondary to the diarrhea and vomiting. Patient also stated that after she started taking Pepto-Bismol she noticed dark stools concerning for blood in her stool. Patient denies any bright red blood. She denies any chest pain, shortness of breath fever or chills. Patient denies any sick contacts. PAST MEDICAL / SURGICAL / SOCIAL / FAMILY HISTORY      has a past medical history of COPD (chronic obstructive pulmonary disease) ([facility] [address] Hyperlipidemia, Hypertension, Hyperthyroidism, Mild intermittent asthma without complication, Substance abuse ([facility] [address] and Wheezing. has a past surgical history that includes Colonoscopy and pr colon ca scrn not hi rsk ind (N/A, 3/16/2017).      Social History     Socioeconomic History    Marital status: Single     Spouse name: Not on file    Number of children: Not on file    Years of education: Not on file    Highest education level: Not on file   Occupational History    Not on file   Tobacco Use    Smoking status: Current Every Day Smoker     Packs/day: 0.50     Years: 35.00     Pack years: 17.50 Types: Cigarettes    Smokeless tobacco: Never Used   Vaping Use    Vaping Use: Never used   Substance and Sexual Activity    Alcohol use: No     Comment: recovering alcoholic    Drug use: Yes     Comment: marjuna occassionally    Sexual activity: Not on file   Other Topics Concern    Not on file   Social History Narrative    Not on file     Social Determinants of Health     Financial Resource Strain: Medium Risk    Difficulty of Paying Living Expenses: Somewhat hard   Food Insecurity: Food Insecurity Present    Worried About Running Out of Food in the Last Year: Sometimes true    Jim of Food in the Last Year: Sometimes true   Transportation Needs:     Lack of Transportation (Medical): Not on file    Lack of Transportation (Non-Medical): Not on file   Physical Activity:     Days of Exercise per Week: Not on file    Minutes of Exercise per Session: Not on file   Stress:     Feeling of Stress : Not on file   Social Connections:     Frequency of Communication with Friends and Family: Not on file    Frequency of Social Gatherings with Friends and Family: Not on file    Attends [employer] Services: Not on file    Active Member of [address] [place] [address] or Organizations: Not on file    Attends Club or Organization Meetings: Not on file    Marital Status: Not on file   Intimate Partner Violence:     Fear of Current or Ex-Partner: Not on file    Emotionally Abused: Not on file    Physically Abused: Not on file    Sexually Abused: Not on file   Housing Stability:     Unable to Pay for Housing in the Last Year: Not on file    Number of [place] in the Last Year: Not on file    Unstable Housing in the Last Year: Not on file       Family History   Problem Relation Age of Onset    Heart Disease Mother     Lung Cancer Mother 54    Heart Disease Father     Stroke Father         Allergies:  Patient has no known allergies.     Home Medications:  Prior to Admission medications    Medication Sig Start Date End Date Taking? Authorizing Provider   ondansetron (ZOFRAN ODT) 4 MG disintegrating tablet Take 1 tablet by mouth every 8 hours as needed for Nausea or Vomiting 3/9/22  Yes Negro Croissant, DO   albuterol sulfate  (90 Base) MCG/ACT inhaler inhale 2 puffs by mouth every 6 hours if needed for wheezing 2/24/22   Vicky Barrera MD   lisinopril-hydroCHLOROthiazide (PRINZIDE;ZESTORETIC) 20-25 MG per tablet take 1 tablet by mouth once daily 2/16/22   Vicky Barrera MD   metFORMIN (GLUCOPHAGE) 500 MG tablet take 1 tablet by mouth EVERY MORNING WITH BREAKFAST 12/15/21   Nnamdi Gustafson MD   naproxen (NAPROSYN) 500 MG tablet Take 1 tablet by mouth as needed for Pain 12/15/21 1/14/22  Nnamdi Gustafson MD   pravastatin (PRAVACHOL) 40 MG tablet take 1 tablet by mouth at bedtime 12/15/21   Nnamdi Gustafson MD   Blood Pressure KIT Use to monitor blood pressure daily and as needed 7/17/20   CASEY Guerra - CNP       REVIEW OFSYSTEMS    (2-9 systems for level 4, 10 or more for level 5)      Review of Systems   Constitutional: Negative for chills, diaphoresis, fatigue and fever. HENT: Negative for congestion, sinus pressure, sinus pain, sore throat and trouble swallowing. Eyes: Negative for photophobia, redness and visual disturbance. Respiratory: Negative for cough, chest tightness and shortness of breath. Cardiovascular: Negative for chest pain, palpitations and leg swelling. Gastrointestinal: Positive for blood in stool, diarrhea, nausea and vomiting. Negative for abdominal pain and constipation. Genitourinary: Negative for difficulty urinating, dysuria, flank pain and urgency. Musculoskeletal: Negative for back pain, neck pain and neck stiffness. Skin: Negative for color change, pallor and rash. Neurological: Negative for dizziness, tremors, speech difficulty, weakness, light-headedness and headaches.        PHYSICAL EXAM   (up to 7 for level 4, 8 or more forlevel 5)      INITIAL VITALS:   ED Triage Vitals   BP Temp Temp src Pulse Resp SpO2 Height Weight   168/91 98.8 oral 95 19 97% -- --       Physical Exam  Constitutional:       General: She is not in acute distress. Appearance: She is obese. She is not ill-appearing or toxic-appearing. HENT:      Head: Normocephalic and atraumatic. Right Ear: External ear normal.      Left Ear: External ear normal.      Nose: Nose normal. No rhinorrhea. Mouth/Throat:      Mouth: Mucous membranes are dry. Eyes:      Extraocular Movements: Extraocular movements intact. Pupils: Pupils are equal, round, and reactive to light. Cardiovascular:      Rate and Rhythm: Normal rate and regular rhythm. Pulses: Normal pulses. Heart sounds: No murmur heard. Pulmonary:      Effort: Pulmonary effort is normal.      Breath sounds: Normal breath sounds. Abdominal:      General: Bowel sounds are normal. There is no distension. Palpations: Abdomen is soft. Tenderness: There is abdominal tenderness (Diffuse tenderness to palpation). Musculoskeletal:         General: No swelling. Normal range of motion. Cervical back: Normal range of motion and neck supple. Skin:     General: Skin is warm and dry. Coloration: Skin is not pale. Neurological:      General: No focal deficit present. Mental Status: She is alert and oriented to person, place, and time.          DIFFERENTIAL  DIAGNOSIS     PLAN (LABS / IMAGING / EKG):  Orders Placed This Encounter   Procedures    CBC with Auto Differential    Comprehensive Metabolic Panel w/ Reflex to MG    Magnesium    Urinalysis with Reflex to Culture    EKG 12 Lead       MEDICATIONS ORDERED:  Orders Placed This Encounter   Medications    0.9 % sodium chloride bolus    ondansetron (ZOFRAN) injection 4 mg    ondansetron (ZOFRAN ODT) 4 MG disintegrating tablet     Sig: Take 1 tablet by mouth every 8 hours as needed for Nausea or Vomiting     Dispense:  10 tablet     Refill:  0       DDX: Gastroenteritis, gastritis, and diarrheal illness    Initial MDM/Plan: 59 y.o. female who presents with concerns for nausea vomiting diarrhea for the last 2 days. Patient concern for blood in her stool after taking Pepto-Bismol. Patient is well-appearing on exam mildly tachycardic that improved with rest.  We will plan for fluid resuscitation, antiemetics and lab work. DIAGNOSTIC RESULTS / EMERGENCYDEPARTMENT COURSE / MDM     LABS:  Labs Reviewed   COMPREHENSIVE METABOLIC PANEL W/ REFLEX TO MG FOR LOW K - Abnormal; Notable for the following components:       Result Value    AST 32 (*)     All other components within normal limits   CBC WITH AUTO DIFFERENTIAL   MAGNESIUM   URINALYSIS WITH REFLEX TO CULTURE         RADIOLOGY:  No results found. EKG      All EKG's are interpreted by the Emergency Department Physicianwho either signs or Co-signs this chart in the absence of a cardiologist.    EMERGENCY DEPARTMENT COURSE:  ED Course as of 03/09/22 1556   Wed Mar 09, 2022   1530 Patient lab work within normal limits. Patient reevaluated states she is feeling much better after the Zofran. Patient requesting crackers in this ER minutes. [CD]   6482 Given strict return precautions, instructed to follow clear liquid diet over the next couple of days. Will write off for work given concerns for viral gastroenteritis [CD]      ED Course User Index  [CD] Anh Aguirre DO          PROCEDURES:  None    CONSULTS:  None    CRITICAL CARE:  Please see attending documentation    FINAL IMPRESSION      1.  Gastroenteritis          DISPOSITION / PLAN     DISPOSITION Decision To Discharge 03/09/2022 03:32:06 PM      PATIENT REFERRED TO:  Anna Florentino MD  [address] [id number] [phone]    Call in 3 days  For ED follow up    [facility] ED  [address] [place] [address]  [phone]  Go to   If symptoms worsen      DISCHARGE MEDICATIONS:  New Prescriptions    ONDANSETRON (ZOFRAN ODT) 4 MG DISINTEGRATING TABLET    Take 1 tablet by mouth every 8 hours as needed for Nausea or Vomiting       Tiana Guthrie DO  Emergency Medicine Resident    (Please note that portions of this note were completed with a voice recognition program.Efforts were made to edit the dictations but occasionally words are mis-transcribed.)        Tiana Guthrie DO  Resident  03/09/22 1920

## 2022-03-09 NOTE — Clinical Note
Sidney Schilder was seen and treated in our emergency department on 3/9/2022. She may return to work on 03/12/2022. If you have any questions or concerns, please don't hesitate to call.       Kristopher Baker, DO

## 2022-03-09 NOTE — ED TRIAGE NOTES
Patient C/O lower abdominal pain, diarrhea, dark red blood in stool and inability to keep food down.

## 2022-03-10 LAB
EKG ATRIAL RATE: 92 BPM
EKG P AXIS: 48 DEGREES
EKG P-R INTERVAL: 128 MS
EKG Q-T INTERVAL: 366 MS
EKG QRS DURATION: 66 MS
EKG QTC CALCULATION (BAZETT): 452 MS
EKG R AXIS: 67 DEGREES
EKG T AXIS: 58 DEGREES
EKG VENTRICULAR RATE: 92 BPM

## 2022-03-17 DIAGNOSIS — I10 ESSENTIAL HYPERTENSION: ICD-10-CM

## 2022-03-17 RX ORDER — LISINOPRIL AND HYDROCHLOROTHIAZIDE 25; 20 MG/1; MG/1
TABLET ORAL
Qty: 30 TABLET | Refills: 0 | Status: SHIPPED | OUTPATIENT
Start: 2022-03-17 | End: 2022-04-23

## 2022-03-17 NOTE — TELEPHONE ENCOUNTER
Request for Lisinopril HCTZ.       Next Visit Date:  Future Appointments   Date Time Provider Tessa Johni   3/30/2022  3:30 PM John Bradshaw MD [facility] IM [address] Maintenance   Topic Date Due    Cervical cancer screen  Never done    Shingles Vaccine (1 of 2) Never done    Breast cancer screen  01/30/2019    Flu vaccine (1) 09/01/2021    COVID-19 Vaccine (3 - Booster for Moderna series) 11/09/2021    Colorectal Cancer Screen  03/16/2022    Lipid screen  03/29/2022    Depression Monitoring  03/29/2022    A1C test (Diabetic or Prediabetic)  12/15/2022    Potassium monitoring  03/09/2023    Creatinine monitoring  03/09/2023    Pneumococcal 0-64 years Vaccine (2 of 2 - PPSV23) 04/12/2023    DTaP/Tdap/Td vaccine (2 - Td or Tdap) 09/08/2024    Hepatitis C screen  Completed    HIV screen  Completed    Hepatitis A vaccine  Aged Out    Hepatitis B vaccine  Aged Out    Hib vaccine  Aged Out    Meningococcal (ACWY) vaccine  Aged Out       Hemoglobin A1C (%)   Date Value   12/15/2021 5.5   03/29/2021 5.7   02/10/2020 5.8             ( goal A1C is < 7)   No results found for: LABMICR  LDL Cholesterol (mg/dL)   Date Value   03/29/2021 114       (goal LDL is <100)   AST (U/L)   Date Value   03/09/2022 32 (H)     ALT (U/L)   Date Value   03/09/2022 17     BUN (mg/dL)   Date Value   03/09/2022 16     BP Readings from Last 3 Encounters:   03/09/22 (!) 168/91   12/15/21 (!) 157/88   03/29/21 (!) 151/88          (goal 120/80)    All Future Testing planned in [facility]  Lab Frequency Next Occurrence   LAURA Digital Screen Bilateral [[id number]] Once 03/29/2022   XR LUMBAR SPINE (2-3 VIEWS) Once 55/82/3651   Basic Metabolic Panel Once 91/50/9906   CBC With Auto Differential Once 03/23/2022   LAURA DIGITAL SCREEN W OR WO CAD BILATERAL Once 03/30/2022         Patient Active Problem List:     Essential hypertension     Mixed hyperlipidemia     Mild intermittent asthma without complication     Pre-diabetes Smoking greater than 20 pack years     Sigmoid diverticulosis     Chronic bilateral low back pain with left-sided sciatica

## 2022-03-30 ENCOUNTER — OFFICE VISIT (OUTPATIENT)
Dept: INTERNAL MEDICINE | Age: 62
End: 2022-03-30

## 2022-03-30 VITALS
SYSTOLIC BLOOD PRESSURE: 167 MMHG | HEIGHT: 61 IN | DIASTOLIC BLOOD PRESSURE: 93 MMHG | HEART RATE: 91 BPM | WEIGHT: 151.4 LBS | BODY MASS INDEX: 28.58 KG/M2

## 2022-03-30 DIAGNOSIS — K57.30 SIGMOID DIVERTICULOSIS: Primary | ICD-10-CM

## 2022-03-30 DIAGNOSIS — Z12.11 COLON CANCER SCREENING: ICD-10-CM

## 2022-03-30 DIAGNOSIS — G89.29 CHRONIC BILATERAL LOW BACK PAIN WITH LEFT-SIDED SCIATICA: ICD-10-CM

## 2022-03-30 DIAGNOSIS — K21.00 GASTROESOPHAGEAL REFLUX DISEASE WITH ESOPHAGITIS, UNSPECIFIED WHETHER HEMORRHAGE: ICD-10-CM

## 2022-03-30 DIAGNOSIS — I10 ESSENTIAL HYPERTENSION: ICD-10-CM

## 2022-03-30 DIAGNOSIS — E78.2 MIXED HYPERLIPIDEMIA: ICD-10-CM

## 2022-03-30 DIAGNOSIS — M54.42 CHRONIC BILATERAL LOW BACK PAIN WITH LEFT-SIDED SCIATICA: ICD-10-CM

## 2022-03-30 PROCEDURE — 99213 OFFICE O/P EST LOW 20 MIN: CPT | Performed by: STUDENT IN AN ORGANIZED HEALTH CARE EDUCATION/TRAINING PROGRAM

## 2022-03-30 RX ORDER — AMLODIPINE BESYLATE 10 MG/1
10 TABLET ORAL DAILY
Qty: 30 TABLET | Refills: 5 | Status: SHIPPED | OUTPATIENT
Start: 2022-03-30 | End: 2022-10-14

## 2022-03-30 RX ORDER — ACETAMINOPHEN 500 MG
500 TABLET ORAL 4 TIMES DAILY PRN
Qty: 120 TABLET | Refills: 0 | Status: SHIPPED | OUTPATIENT
Start: 2022-03-30

## 2022-03-30 RX ORDER — PANTOPRAZOLE SODIUM 20 MG/1
20 TABLET, DELAYED RELEASE ORAL
Qty: 30 TABLET | Refills: 1 | Status: SHIPPED | OUTPATIENT
Start: 2022-03-30 | End: 2022-05-31

## 2022-03-30 RX ORDER — CARVEDILOL 12.5 MG/1
12.5 TABLET ORAL 2 TIMES DAILY
Qty: 60 TABLET | Refills: 0 | Status: CANCELLED | OUTPATIENT
Start: 2022-03-30 | End: 2022-04-29

## 2022-03-30 ASSESSMENT — PATIENT HEALTH QUESTIONNAIRE - PHQ9
SUM OF ALL RESPONSES TO PHQ9 QUESTIONS 1 & 2: 1
1. LITTLE INTEREST OR PLEASURE IN DOING THINGS: 1
SUM OF ALL RESPONSES TO PHQ QUESTIONS 1-9: 3
4. FEELING TIRED OR HAVING LITTLE ENERGY: 1
9. THOUGHTS THAT YOU WOULD BE BETTER OFF DEAD, OR OF HURTING YOURSELF: 0
2. FEELING DOWN, DEPRESSED OR HOPELESS: 0
SUM OF ALL RESPONSES TO PHQ QUESTIONS 1-9: 3
5. POOR APPETITE OR OVEREATING: 0
2. FEELING DOWN, DEPRESSED OR HOPELESS: 1
SUM OF ALL RESPONSES TO PHQ9 QUESTIONS 1 & 2: 2
8. MOVING OR SPEAKING SO SLOWLY THAT OTHER PEOPLE COULD HAVE NOTICED. OR THE OPPOSITE, BEING SO FIGETY OR RESTLESS THAT YOU HAVE BEEN MOVING AROUND A LOT MORE THAN USUAL: 0
SUM OF ALL RESPONSES TO PHQ QUESTIONS 1-9: 3
SUM OF ALL RESPONSES TO PHQ QUESTIONS 1-9: 2
6. FEELING BAD ABOUT YOURSELF - OR THAT YOU ARE A FAILURE OR HAVE LET YOURSELF OR YOUR FAMILY DOWN: 1
SUM OF ALL RESPONSES TO PHQ QUESTIONS 1-9: 2
7. TROUBLE CONCENTRATING ON THINGS, SUCH AS READING THE NEWSPAPER OR WATCHING TELEVISION: 0
10. IF YOU CHECKED OFF ANY PROBLEMS, HOW DIFFICULT HAVE THESE PROBLEMS MADE IT FOR YOU TO DO YOUR WORK, TAKE CARE OF THINGS AT HOME, OR GET ALONG WITH OTHER PEOPLE: 0
SUM OF ALL RESPONSES TO PHQ QUESTIONS 1-9: 2
SUM OF ALL RESPONSES TO PHQ QUESTIONS 1-9: 2
3. TROUBLE FALLING OR STAYING ASLEEP: 0
1. LITTLE INTEREST OR PLEASURE IN DOING THINGS: 1
SUM OF ALL RESPONSES TO PHQ QUESTIONS 1-9: 3

## 2022-03-30 ASSESSMENT — ENCOUNTER SYMPTOMS
EYES NEGATIVE: 1
VOMITING: 1
ABDOMINAL DISTENTION: 0
BLOOD IN STOOL: 1
BACK PAIN: 1
ABDOMINAL PAIN: 1
RESPIRATORY NEGATIVE: 1
ALLERGIC/IMMUNOLOGIC NEGATIVE: 1
DIARRHEA: 1
CONSTIPATION: 1

## 2022-03-30 NOTE — PROGRESS NOTES
Attending Physician Statement  I have discussed the care of Graham Fuller including pertinent history and exam findings,  with the resident. I have reviewed the key elements of all parts of the encounter with the resident. I agree with the assessment, plan and orders as documented by the resident.   (GE Modifier)    MD IAN Love  Attending Physician, [address] [place] [address], Internal Medicine Residency Program  [address] [place] [address]  3/30/2022, 5:05 PM

## 2022-03-30 NOTE — PROGRESS NOTES
[facility] IM [id number] [facility] [address]  Dept: [phone]  Dept Fax: [phone]    Office Progress/Follow Up Note  Date ofpatient's visit: 3/30/2022  Patient's Name:  Arrie Cushing YOB: 1960            Patient Care Team:  Christelle Pappas MD as PCP - General (Internal Medicine)  ================================================================    REASON FOR VISIT/CHIEF COMPLAINT:  Hypertension (pt took medication today), Diarrhea (x 1 day, pt feel cold), and Health Maintenance (pt refused flu, shingrix, flu)    HISTORY OF PRESENTING ILLNESS:  History was obtained from: patient, electronic medical record. Radha Cruz a 59 y.o. is here for a follow-up appointment for essential hypertension and also complains of diarrhea for 1 day. Patient has longstanding essential hypertension on lisinopril hydrochlorothiazide 20-25 mg.  continues to smoke 1 pack of cigarettes every 3 days. Blood pressure in office is 167/93 with heart rate of 91. Patient will be started on amlodipine 10 mg daily for now and was advised to stop smoking and stop taking naproxen for her pain and switch to Tylenol. Patient also noticed she has intermittent diarrhea for the last few months. She continues to take naproxen 5 mg twice daily for her chronic back pain associated with sciatica. Currently complaining of epigastric pain along with nausea and had episodes of vomiting. She has lost 26 pounds over the last 2 years which is intentional.  Her history is also suspicious for GERD. She had a last colonoscopy in 2017 which is a poor prep showing hemorrhoids and sigmoid diverticulosis and a repeat colonoscopy is indicated in 5 years. She also been noticed to have straining during defecation and often had stool mixed with blood.     Denies fever, loss of appetite denies any chills denies any overt signs of bleeding    Patient Active Problem List   Diagnosis    Essential hypertension    Mixed hyperlipidemia    Mild intermittent asthma without complication    Pre-diabetes    Smoking greater than 20 pack years    Sigmoid diverticulosis    Chronic bilateral low back pain with left-sided sciatica       Health Maintenance Due   Topic Date Due    Cervical cancer screen  Never done    Shingles Vaccine (1 of 2) Never done    Breast cancer screen  01/30/2019    Flu vaccine (1) 09/01/2021    COVID-19 Vaccine (3 - Booster for Moderna series) 11/09/2021    Colorectal Cancer Screen  03/16/2022    Lipid screen  03/29/2022    Depression Monitoring  03/29/2022       No Known Allergies      Current Outpatient Medications   Medication Sig Dispense Refill    pantoprazole (PROTONIX) 20 MG tablet Take 1 tablet by mouth every morning (before breakfast) 30 tablet 1    acetaminophen (TYLENOL) 500 MG tablet Take 1 tablet by mouth 4 times daily as needed for Pain 120 tablet 0    amLODIPine (NORVASC) 10 MG tablet Take 1 tablet by mouth daily 30 tablet 5    lisinopril-hydroCHLOROthiazide (PRINZIDE;ZESTORETIC) 20-25 MG per tablet take 1 tablet by mouth once daily 30 tablet 0    ondansetron (ZOFRAN ODT) 4 MG disintegrating tablet Take 1 tablet by mouth every 8 hours as needed for Nausea or Vomiting 10 tablet 0    albuterol sulfate  (90 Base) MCG/ACT inhaler inhale 2 puffs by mouth every 6 hours if needed for wheezing 8.5 g 3    pravastatin (PRAVACHOL) 40 MG tablet take 1 tablet by mouth at bedtime 120 tablet 3    Blood Pressure KIT Use to monitor blood pressure daily and as needed 1 kit 0     No current facility-administered medications for this visit.        Social History     Tobacco Use    Smoking status: Current Every Day Smoker     Packs/day: 0.50     Years: 35.00     Pack years: 17.50     Types: Cigarettes    Smokeless tobacco: Never Used   Vaping Use    Vaping Use: Never used   Substance Use Topics    Alcohol use: No     Comment: recovering alcoholic    Drug use: Yes     Comment: amari occassionally       Family History   Problem Relation Age of Onset    Heart Disease Mother     Lung Cancer Mother 54    Heart Disease Father     Stroke Father         REVIEW OF SYSTEMS:  Review of Systems   Constitutional: Negative for activity change, diaphoresis, fatigue, fever and unexpected weight change. HENT: Negative. Eyes: Negative. Respiratory: Negative. Cardiovascular: Negative. Gastrointestinal: Positive for abdominal pain, blood in stool, constipation, diarrhea and vomiting. Negative for abdominal distention. Endocrine: Negative. Genitourinary: Negative. Musculoskeletal: Positive for arthralgias and back pain. Allergic/Immunologic: Negative. Neurological: Negative. Hematological: Negative. Psychiatric/Behavioral: Negative. PHYSICAL EXAM:  Vitals:    03/30/22 1608 03/30/22 1613   BP: (!) 190/94 (!) 167/93   Site: Left Upper Arm    Position: Sitting    Cuff Size: Medium Adult    Pulse: 90 91   Weight: 151 lb 6.4 oz (68.7 kg)    Height: 5' 1\" (1.549 m)      BP Readings from Last 3 Encounters:   03/30/22 (!) 167/93   03/09/22 (!) 168/91   12/15/21 (!) 157/88        Physical Exam  Constitutional:       Appearance: Normal appearance. HENT:      Head: Normocephalic. Mouth/Throat:      Mouth: Mucous membranes are dry. Cardiovascular:      Rate and Rhythm: Normal rate and regular rhythm. Pulses: Normal pulses. Heart sounds: Normal heart sounds. Abdominal:      General: Abdomen is flat. Tenderness: There is abdominal tenderness. Musculoskeletal:         General: Normal range of motion. Skin:     General: Skin is warm. Capillary Refill: Capillary refill takes less than 2 seconds. Neurological:      General: No focal deficit present. Mental Status: She is alert.    Psychiatric:         Mood and Affect: Mood normal.         Behavior: Behavior normal.         DIAGNOSTIC FINDINGS:  CBC:  Lab Results   Component Value Date    WBC 10.6 03/09/2022    HGB 13.1 03/09/2022     03/09/2022       BMP:    Lab Results   Component Value Date     03/09/2022    K 3.7 03/09/2022     03/09/2022    CO2 24 03/09/2022    BUN 16 03/09/2022    CREATININE 0.78 03/09/2022    GLUCOSE 80 03/09/2022    GLUCOSE 92 04/10/2012       HEMOGLOBIN A1C:   Lab Results   Component Value Date    LABA1C 5.5 12/15/2021       FASTING LIPID PANEL:  Lab Results   Component Value Date    CHOL 214 (H) 03/29/2021    HDL 47 03/29/2021    TRIG 265 (H) 03/29/2021       ASSESSMENT AND PLAN:  Chris Robles was seen today for hypertension, diarrhea and health maintenance. Diagnoses and all orders for this visit:    Sigmoid diverticulosis  -     acetaminophen (TYLENOL) 500 MG tablet; Take 1 tablet by mouth 4 times daily as needed for Pain    Chronic bilateral low back pain with left-sided sciatica    Gastroesophageal reflux disease with esophagitis, unspecified whether hemorrhage  -     pantoprazole (PROTONIX) 20 MG tablet; Take 1 tablet by mouth every morning (before breakfast)    Mixed hyperlipidemia  -     Lipid, Fasting; Future    Essential hypertension    Colon cancer screening  -     [facility] - Erick Vences MD, Gastroenterology, [place]  -     amLODIPine (NORVASC) 10 MG tablet; Take 1 tablet by mouth daily    -Add amlodipine 10 mg daily for controlling her blood pressure  -Advised to stop smoking and stop taking naproxen  Start the patient on pantoprazole 20 mg daily along with GI consult for getting a repeat colonoscopy and EGD, given concerning symptoms for gastritis and need of repeat colonoscopy  Discontinue paroxetine continue taking Tylenol 5 mg    FOLLOW UP AND INSTRUCTIONS:  Return in about 8 weeks (around 5/25/2022). · Chris Robles received counseling on the following healthy behaviors: nutrition, medication adherence and tobacco cessation    · Discussed use, benefit, and side effects of prescribed medications.   Barriers to medication compliance addressed. All patient questions answered. Pt voiced understanding. · Patient given educational materials - see patient instructions    Rashard Harmon MD  Internal Medicine Resident, PGY- [address] [facility]; [place], [place]  3/30/2022, 5:12 PM      This note is created with the assistance of a speech-recognition program. While intending to generate a document that actually reflects the content of thevisit, the document can still have some mistakes which may not have been identified and corrected by editing.

## 2022-03-30 NOTE — PATIENT INSTRUCTIONS
Medications e-scribe to pharmacy of pt's choice. Laboratory Instructions: Your doctor has ordered blood or urine testing. You can get this testing done at the Lab located on the first floor of the [facility], or at any other [facility]. Please stop at Main Registration, before going to the lab, as you must be registered first.   Please get this lab done ASAP. You may NOT eat, drink, smoke, or chew anything before this test for 8 hours. You may still have water. Labs given to patient  Referral to be  placed, summary of care printed and faxed to office, phone numbers given to the patient, they will contact office for an appt      Return To Clinic 5/31/2022. Please bring all of your medications to this upcoming appointment. After Visit Summary  given and reviewed. It is very important for your care that you keep your appointment. If for some reason you are unable to keep your appointme  nt it is equally important that you call our office at [phone] to cancel your appointment and reschedule. Failure to do so may result in your termination from our practice.     JANELLE

## 2022-04-19 DIAGNOSIS — I10 ESSENTIAL HYPERTENSION: ICD-10-CM

## 2022-04-20 NOTE — TELEPHONE ENCOUNTER
Request for Lisinopril HCTZ.       Next Visit Date:  Future Appointments   Date Time Provider Tessa Spencer   5/31/2022 10:00 AM Tiffany Hurley MD [facility] IM [address] Maintenance   Topic Date Due    Cervical cancer screen  Never done    Shingles Vaccine (1 of 2) Never done    Pneumococcal 0-64 years Vaccine (2 - PCV) 10/30/2018    Breast cancer screen  01/30/2019    COVID-19 Vaccine (3 - Booster for Moderna series) 11/09/2021    Colorectal Cancer Screen  03/16/2022    Lipids  03/29/2022    Flu vaccine (Season Ended) 09/01/2022    A1C test (Diabetic or Prediabetic)  12/15/2022    Potassium  03/09/2023    Creatinine  03/09/2023    Depression Monitoring  03/30/2023    DTaP/Tdap/Td vaccine (2 - Td or Tdap) 09/08/2024    Hepatitis C screen  Completed    HIV screen  Completed    Hepatitis A vaccine  Aged Out    Hepatitis B vaccine  Aged Out    Hib vaccine  Aged Out    Meningococcal (ACWY) vaccine  Aged Out       Hemoglobin A1C (%)   Date Value   12/15/2021 5.5   03/29/2021 5.7   02/10/2020 5.8             ( goal A1C is < 7)   No results found for: LABMICR  LDL Cholesterol (mg/dL)   Date Value   03/29/2021 114       (goal LDL is <100)   AST (U/L)   Date Value   03/09/2022 32 (H)     ALT (U/L)   Date Value   03/09/2022 17     BUN (mg/dL)   Date Value   03/09/2022 16     BP Readings from Last 3 Encounters:   03/30/22 (!) 167/93   03/09/22 (!) 168/91   12/15/21 (!) 157/88          (goal 120/80)    All Future Testing planned in [facility]  Lab Frequency Next Occurrence   LAURA DIGITAL SCREEN W OR WO CAD BILATERAL Once 03/30/2022   Lipid, Fasting Once 07/07/2022         Patient Active Problem List:     Essential hypertension     Mixed hyperlipidemia     Mild intermittent asthma without complication     Pre-diabetes     Smoking greater than 20 pack years     Sigmoid diverticulosis     Chronic bilateral low back pain with left-sided sciatica

## 2022-04-23 RX ORDER — LISINOPRIL AND HYDROCHLOROTHIAZIDE 25; 20 MG/1; MG/1
TABLET ORAL
Qty: 30 TABLET | Refills: 1 | Status: SHIPPED | OUTPATIENT
Start: 2022-04-23 | End: 2022-06-21

## 2022-04-25 ENCOUNTER — TELEPHONE (OUTPATIENT)
Dept: INTERNAL MEDICINE | Age: 62
End: 2022-04-25

## 2022-04-25 NOTE — LETTER
[address]  [address] [address]  Phone: [phone]  Fax: [phone]    Shelby Wright MD        April 25, 2022    [address] [place] [address] [address] [facility] [address]      Dear Lynnette Bamberger: This letter is a reminder that you may have diagnostic Mammogram that has not been completed. It is important to your well-being that this test is performed. Please find the outstanding order attached. You can have the test completed at [facility], [facility]. [facility] or [facility]. Please see the order for scheduling instructions. Please call [phone] to schedule an appointment. We are also now offering service at our [facility] for more information or to schedule your mammogram call [address] [place] [address]([phone])  Please call our office at Dept: [phone] for additional information on the outstanding test or let us know if they have been completed so we may update your chart. If you have any questions or concerns, please don't hesitate to call.     Sincerely,        Shelby Wright MD

## 2022-05-31 ENCOUNTER — OFFICE VISIT (OUTPATIENT)
Dept: INTERNAL MEDICINE | Age: 62
End: 2022-05-31

## 2022-05-31 VITALS
DIASTOLIC BLOOD PRESSURE: 75 MMHG | BODY MASS INDEX: 26.62 KG/M2 | TEMPERATURE: 97.2 F | SYSTOLIC BLOOD PRESSURE: 119 MMHG | OXYGEN SATURATION: 96 % | HEIGHT: 61 IN | HEART RATE: 89 BPM | WEIGHT: 141 LBS

## 2022-05-31 DIAGNOSIS — M54.42 CHRONIC BILATERAL LOW BACK PAIN WITH LEFT-SIDED SCIATICA: Chronic | ICD-10-CM

## 2022-05-31 DIAGNOSIS — R73.03 PRE-DIABETES: ICD-10-CM

## 2022-05-31 DIAGNOSIS — G89.29 CHRONIC BILATERAL LOW BACK PAIN WITH LEFT-SIDED SCIATICA: Chronic | ICD-10-CM

## 2022-05-31 DIAGNOSIS — I10 ESSENTIAL HYPERTENSION: Primary | Chronic | ICD-10-CM

## 2022-05-31 DIAGNOSIS — F17.210 SMOKING GREATER THAN 20 PACK YEARS: Chronic | ICD-10-CM

## 2022-05-31 DIAGNOSIS — J45.20 MILD INTERMITTENT ASTHMA WITHOUT COMPLICATION: ICD-10-CM

## 2022-05-31 DIAGNOSIS — Z12.11 COLON CANCER SCREENING: ICD-10-CM

## 2022-05-31 DIAGNOSIS — E78.2 MIXED HYPERLIPIDEMIA: Chronic | ICD-10-CM

## 2022-05-31 DIAGNOSIS — K57.30 SIGMOID DIVERTICULOSIS: ICD-10-CM

## 2022-05-31 DIAGNOSIS — Z59.9 NEED FOR FINANCIAL SUPPORT: ICD-10-CM

## 2022-05-31 PROBLEM — F12.90 MARIJUANA SMOKER: Status: ACTIVE | Noted: 2022-05-31

## 2022-05-31 PROCEDURE — 99213 OFFICE O/P EST LOW 20 MIN: CPT | Performed by: STUDENT IN AN ORGANIZED HEALTH CARE EDUCATION/TRAINING PROGRAM

## 2022-05-31 RX ORDER — ALBUTEROL SULFATE 90 UG/1
2 AEROSOL, METERED RESPIRATORY (INHALATION) 4 TIMES DAILY PRN
Qty: 18 G | Refills: 5 | Status: SHIPPED | OUTPATIENT
Start: 2022-05-31

## 2022-05-31 SDOH — ECONOMIC STABILITY - INCOME SECURITY: PROBLEM RELATED TO HOUSING AND ECONOMIC CIRCUMSTANCES, UNSPECIFIED: Z59.9

## 2022-05-31 ASSESSMENT — ENCOUNTER SYMPTOMS
SHORTNESS OF BREATH: 1
DIARRHEA: 0
BACK PAIN: 1
BLOOD IN STOOL: 0
WHEEZING: 1
RECTAL PAIN: 0
ANAL BLEEDING: 0
CONSTIPATION: 0
ABDOMINAL PAIN: 0
EYES NEGATIVE: 1
GASTROINTESTINAL NEGATIVE: 1
ALLERGIC/IMMUNOLOGIC NEGATIVE: 1

## 2022-05-31 NOTE — PATIENT INSTRUCTIONS
Hypertension Instructions :     Learn to measure your own blood pressure. Keep a record Blood pressure log of your results and bring it to the office on next visit , we will go through the readings . Take your blood pressure medicine exactly as directed. Dont skip doses. Missing doses can cause your blood pressure to get out of control. Stay at a healthy weight. Get help to lose any extra pounds . Cut back on salt. ideal amount of sodium is no more than 1,500 mg a day. Follow the DASH (Dietary Approaches to Stop Hypertension) eating plan. This plan recommends vegetables, fruits, whole gains. Begin an exercise program. Simple activities such as walking can help. Limit drinks that contain caffeine such as coffee, black or green tea, and cola to 2 per day. Limit alcohol intake if any. Call us right away if you have any of the following:  ? Chest pain or shortness of breath. ? Moderate to severe headache  ? Weakness in the muscles of your face, arms, or legs  ? Trouble speaking  ? Extreme drowsiness  ? Confusion  ? Fainting or dizziness  ? Pulsating or rushing sound in your ears  ? Unexplained nosebleed  ? Weakness, tingling, or numbness of your face, arms, or legs  ? Change in vision  ? Blood pressure measured at home that is greater than 180/110    Patient was put on a wait list and will be contacted to schedule their next follow up appointment once the schedule is available. If the patient is in need of an appointment before their next visit please call the office at [phone]. After Visit Summary  given and reviewed.

## 2022-06-01 ENCOUNTER — TELEPHONE (OUTPATIENT)
Dept: FAMILY MEDICINE CLINIC | Age: 62
End: 2022-06-01

## 2022-06-01 NOTE — TELEPHONE ENCOUNTER
Lovtor Kalyn was contacted  by writer to discuss referral for SDOH related needs. Writer spoke with: Patient and explained the services and assistance that can be provided through the patient navigation program.     Patient agreeable to receiving resources and support from Ry Chowdary. Discussed the following with the patient:      Needs and background info: Seeking financial assistance for medical tests. No insurance, pays out of pocket. Starting new part time job. Plan of Care: I will look for financial assistance for medical tests. Patient states they will call me back to schedule appointment to fill out applications for Medicaid, SNAP, rent, and utility assistance. Follow up with patient necessary: yes    Follow up with resource organization necessary: N/A    Patient has given verbal permission to leave detailed messages regarding SDOH referral on their phone. N/A    Patient has given verbal permission to submit applications on their behalf. N/A    Patient was provided with writer's contact information should they require any additional assistance.        Ada Villafana MA

## 2022-06-08 ENCOUNTER — TELEPHONE (OUTPATIENT)
Dept: FAMILY MEDICINE CLINIC | Age: 62
End: 2022-06-08

## 2022-06-08 NOTE — TELEPHONE ENCOUNTER
Margi Garber was contacted by Lonodn Holm MA, tor Argueta, regarding a Social Determinants of Health referral.     A message was left with the writer's contact information.     Follow up phone call

## 2022-06-14 ENCOUNTER — TELEPHONE (OUTPATIENT)
Dept: FAMILY MEDICINE CLINIC | Age: 62
End: 2022-06-14

## 2022-06-14 NOTE — TELEPHONE ENCOUNTER
Elier Tang was contacted by Luis Miguel Millard MA, a [place] Argueta, regarding a Social Determinants of Health referral.     A message was left with the writer's contact information.     Follow up call

## 2022-06-19 DIAGNOSIS — I10 ESSENTIAL HYPERTENSION: ICD-10-CM

## 2022-06-20 NOTE — TELEPHONE ENCOUNTER
Request for Lisinopril HCTZ. Next Visit Date:  No future appointments.     Health Maintenance   Topic Date Due    Breast cancer screen  01/30/2019    COVID-19 Vaccine (3 - Booster for Moderna series) 11/09/2021    Colorectal Cancer Screen  03/16/2022    Lipids  03/29/2022    Cervical cancer screen  09/27/2022 (Originally 4/12/1981)    Shingles vaccine (1 of 2) 05/31/2023 (Originally 4/12/2008)    Pneumococcal 0-64 years Vaccine (2 - PCV) 06/27/2023 (Originally 10/30/2018)    Flu vaccine (Season Ended) 09/01/2022    A1C test (Diabetic or Prediabetic)  12/15/2022    Depression Monitoring  03/30/2023    DTaP/Tdap/Td vaccine (2 - Td or Tdap) 09/08/2024    Hepatitis C screen  Completed    HIV screen  Completed    Hepatitis A vaccine  Aged Out    Hepatitis B vaccine  Aged Out    Hib vaccine  Aged Out    Meningococcal (ACWY) vaccine  Aged Out       Hemoglobin A1C (%)   Date Value   12/15/2021 5.5   03/29/2021 5.7   02/10/2020 5.8             ( goal A1C is < 7)   No results found for: LABMICR  LDL Cholesterol (mg/dL)   Date Value   03/29/2021 114       (goal LDL is <100)   AST (U/L)   Date Value   03/09/2022 32 (H)     ALT (U/L)   Date Value   03/09/2022 17     BUN (mg/dL)   Date Value   03/09/2022 16     BP Readings from Last 3 Encounters:   05/31/22 119/75   03/30/22 (!) 167/93   03/09/22 (!) 168/91          (goal 120/80)    All Future Testing planned in [facility]  Lab Frequency Next Occurrence   LAURA DIGITAL SCREEN W OR WO CAD BILATERAL Once 07/25/2022   Lipid, Fasting Once 07/07/2022   Hemoglobin A1C Once 05/31/2022         Patient Active Problem List:     Essential hypertension well controlled     Mixed hyperlipidemia     Mild intermittent asthma without complication     Pre-diabetes     Smoking greater than 20 pack years     Sigmoid diverticulosis     Chronic bilateral low back pain with left-sided sciatica     Marijuana smoker

## 2022-06-21 RX ORDER — LISINOPRIL AND HYDROCHLOROTHIAZIDE 25; 20 MG/1; MG/1
TABLET ORAL
Qty: 30 TABLET | Refills: 1 | Status: SHIPPED | OUTPATIENT
Start: 2022-06-21

## 2022-06-30 NOTE — PROGRESS NOTES
Attending Physician Statement  I have discussed the care of Masoud Howard including pertinent history and exam findings,  with the resident. I have reviewed the key elements of all parts of the encounter with the resident. I agree with the assessment, plan and orders as documented by the resident.   (GE Modifier)    MD IAN Olivier  Attending Physician, [address] [place] [address], Internal Medicine Residency Program  [address] [place] [address]  6/30/2022, 10:12 AM

## 2022-10-14 DIAGNOSIS — Z12.11 COLON CANCER SCREENING: ICD-10-CM

## 2022-10-14 RX ORDER — AMLODIPINE BESYLATE 10 MG/1
TABLET ORAL
Qty: 30 TABLET | Refills: 5 | Status: SHIPPED | OUTPATIENT
Start: 2022-10-14

## 2022-10-14 NOTE — TELEPHONE ENCOUNTER
E-scribe request for medication amLODIPine . Pt is on wait list till Nov, LM on  for pt to schedule appt . Health Maintenance   Topic Date Due    Cervical cancer screen  Never done    Breast cancer screen  01/30/2019    COVID-19 Vaccine (3 - Booster for Moderna series) 11/09/2021    Colorectal Cancer Screen  03/16/2022    Lipids  03/29/2022    Flu vaccine (1) 08/01/2022    Shingles vaccine (1 of 2) 05/31/2023 (Originally 4/12/2008)    A1C test (Diabetic or Prediabetic)  12/15/2022    Depression Monitoring  03/30/2023    DTaP/Tdap/Td vaccine (2 - Td or Tdap) 09/08/2024    Pneumococcal 0-64 years Vaccine  Completed    Hepatitis C screen  Completed    HIV screen  Completed    Hepatitis A vaccine  Aged Out    Hib vaccine  Aged Out    Meningococcal (ACWY) vaccine  Aged Out             (applicable per patient's age: Cancer Screenings, Depression Screening, Fall Risk Screening, Immunizations)    Hemoglobin A1C (%)   Date Value   12/15/2021 5.5   03/29/2021 5.7   02/10/2020 5.8     LDL Cholesterol (mg/dL)   Date Value   03/29/2021 114     AST (U/L)   Date Value   03/09/2022 32 (H)     ALT (U/L)   Date Value   03/09/2022 17     BUN (mg/dL)   Date Value   03/09/2022 16      (goal A1C is < 7)   (goal LDL is <100) need 30-50% reduction from baseline     BP Readings from Last 3 Encounters:   05/31/22 119/75   03/30/22 (!) 167/93   03/09/22 (!) 168/91    (goal /80)      All Future Testing planned in [facility]:  Lab Frequency Next Occurrence   LAURA DIGITAL SCREEN W OR WO CAD BILATERAL Once 07/25/2022   Lipid, Fasting Once 07/07/2022   Hemoglobin A1C Once 05/31/2022       Next Visit Date:  No future appointments.          Patient Active Problem List:     Essential hypertension well controlled     Mixed hyperlipidemia     Mild intermittent asthma without complication     Pre-diabetes     Smoking greater than 20 pack years     Sigmoid diverticulosis     Chronic bilateral low back pain with left-sided sciatica     Marijuana smoker

## 2022-10-14 NOTE — TELEPHONE ENCOUNTER
Hypertension controlled on amlodipine and prinzide. Appropriate for refill. Lennie Sage MD  Internal Medicine Resident, PGY- [facility];  [place], [place]  10/14/2022, 3:31 PM

## 2023-01-14 DIAGNOSIS — I10 ESSENTIAL HYPERTENSION: ICD-10-CM

## 2023-01-25 RX ORDER — LISINOPRIL AND HYDROCHLOROTHIAZIDE 25; 20 MG/1; MG/1
1 TABLET ORAL DAILY
Qty: 30 TABLET | Refills: 0 | Status: SHIPPED | OUTPATIENT
Start: 2023-01-25 | End: 2023-02-21

## 2023-01-25 NOTE — TELEPHONE ENCOUNTER
Last seen as self pay visit on 5/31/2022 missed last appointment    Controlled hypertension on norvasc and prinzide, appropriate for refill. Next appointment on 2/9/23. Needs to be seen in office for further refills. Margaret Marcus MD  Internal Medicine Resident, [facility];  [place], [place]  1/25/2023, 12:34 PM

## 2023-01-25 NOTE — TELEPHONE ENCOUNTER
Spoke to pt and she needs morning appts and your schedule is for afternoons, so appt was made with new 1st year provider. Pt is out of medication and asking can she have some medication to last up until the appt. Appt made for 2/9/23. Please advise

## 2023-01-26 DIAGNOSIS — J45.20 MILD INTERMITTENT ASTHMA WITHOUT COMPLICATION: Primary | ICD-10-CM

## 2023-01-26 NOTE — TELEPHONE ENCOUNTER
Request for albuterol.   Next evy on 2/9/23    Next Visit Date:  Future Appointments   Date Time Provider Tessa Johanna   2/9/2023  8:30 AM Vanessa Borges MD [address] [facility]   Topic Date Due    Cervical cancer screen  Never done    Breast cancer screen  01/30/2019    COVID-19 Vaccine (3 - Booster for Moderna series) 08/04/2021    Colorectal Cancer Screen  03/16/2022    Lipids  03/29/2022    Flu vaccine (1) 08/01/2022    A1C test (Diabetic or Prediabetic)  12/15/2022    Shingles vaccine (1 of 2) 05/31/2023 (Originally 4/12/2008)    Depression Monitoring  03/30/2023    DTaP/Tdap/Td vaccine (2 - Td or Tdap) 09/08/2024    Pneumococcal 0-64 years Vaccine  Completed    Hepatitis C screen  Completed    HIV screen  Completed    Hepatitis A vaccine  Aged Out    Hib vaccine  Aged Out    Meningococcal (ACWY) vaccine  Aged Out       Hemoglobin A1C (%)   Date Value   12/15/2021 5.5   03/29/2021 5.7   02/10/2020 5.8             ( goal A1C is < 7)   No results found for: LABMICR  LDL Cholesterol (mg/dL)   Date Value   03/29/2021 114       (goal LDL is <100)   AST (U/L)   Date Value   03/09/2022 32 (H)     ALT (U/L)   Date Value   03/09/2022 17     BUN (mg/dL)   Date Value   03/09/2022 16     BP Readings from Last 3 Encounters:   05/31/22 119/75   03/30/22 (!) 167/93   03/09/22 (!) 168/91          (goal 120/80)    All Future Testing planned in [facility]  Lab Frequency Next Occurrence   LAURA DIGITAL SCREEN W OR WO CAD BILATERAL Once 07/25/2022   Lipid, Fasting Once 07/07/2022   Hemoglobin A1C Once 05/31/2022         Patient Active Problem List:     Essential hypertension well controlled     Mixed hyperlipidemia     Mild intermittent asthma without complication     Pre-diabetes     Smoking greater than 20 pack years     Sigmoid diverticulosis     Chronic bilateral low back pain with left-sided sciatica     Marijuana smoker

## 2023-01-27 RX ORDER — ALBUTEROL SULFATE 90 UG/1
AEROSOL, METERED RESPIRATORY (INHALATION)
Qty: 8.5 G | Refills: 1 | Status: SHIPPED | OUTPATIENT
Start: 2023-01-27

## 2023-02-18 DIAGNOSIS — I10 ESSENTIAL HYPERTENSION: ICD-10-CM

## 2023-02-21 RX ORDER — LISINOPRIL AND HYDROCHLOROTHIAZIDE 25; 20 MG/1; MG/1
1 TABLET ORAL DAILY
Qty: 30 TABLET | Refills: 0 | Status: SHIPPED | OUTPATIENT
Start: 2023-02-21 | End: 2023-02-24 | Stop reason: SDUPTHER

## 2023-02-24 ENCOUNTER — OFFICE VISIT (OUTPATIENT)
Dept: INTERNAL MEDICINE | Age: 63
End: 2023-02-24

## 2023-02-24 VITALS
BODY MASS INDEX: 26.47 KG/M2 | HEART RATE: 88 BPM | HEIGHT: 61 IN | TEMPERATURE: 97 F | DIASTOLIC BLOOD PRESSURE: 80 MMHG | OXYGEN SATURATION: 95 % | SYSTOLIC BLOOD PRESSURE: 138 MMHG | WEIGHT: 140.2 LBS

## 2023-02-24 DIAGNOSIS — I10 ESSENTIAL HYPERTENSION: Primary | Chronic | ICD-10-CM

## 2023-02-24 DIAGNOSIS — E78.2 MIXED HYPERLIPIDEMIA: Chronic | ICD-10-CM

## 2023-02-24 DIAGNOSIS — F17.210 SMOKING GREATER THAN 20 PACK YEARS: Chronic | ICD-10-CM

## 2023-02-24 DIAGNOSIS — R73.03 PRE-DIABETES: ICD-10-CM

## 2023-02-24 DIAGNOSIS — Z12.31 ENCOUNTER FOR SCREENING MAMMOGRAM FOR MALIGNANT NEOPLASM OF BREAST: ICD-10-CM

## 2023-02-24 DIAGNOSIS — Z12.11 COLON CANCER SCREENING: ICD-10-CM

## 2023-02-24 LAB — HBA1C MFR BLD: 5.6 %

## 2023-02-24 PROCEDURE — 99211 OFF/OP EST MAY X REQ PHY/QHP: CPT | Performed by: INTERNAL MEDICINE

## 2023-02-24 PROCEDURE — 83036 HEMOGLOBIN GLYCOSYLATED A1C: CPT | Performed by: STUDENT IN AN ORGANIZED HEALTH CARE EDUCATION/TRAINING PROGRAM

## 2023-02-24 RX ORDER — AMLODIPINE BESYLATE 10 MG/1
TABLET ORAL
Qty: 30 TABLET | Refills: 5 | Status: SHIPPED | OUTPATIENT
Start: 2023-02-24

## 2023-02-24 RX ORDER — LISINOPRIL AND HYDROCHLOROTHIAZIDE 25; 20 MG/1; MG/1
1 TABLET ORAL DAILY
Qty: 30 TABLET | Refills: 3 | Status: SHIPPED | OUTPATIENT
Start: 2023-02-24 | End: 2023-06-24

## 2023-02-24 RX ORDER — PRAVASTATIN SODIUM 40 MG
TABLET ORAL
Qty: 120 TABLET | Refills: 1 | Status: SHIPPED | OUTPATIENT
Start: 2023-02-24

## 2023-02-24 ASSESSMENT — PATIENT HEALTH QUESTIONNAIRE - PHQ9
SUM OF ALL RESPONSES TO PHQ QUESTIONS 1-9: 2
5. POOR APPETITE OR OVEREATING: 0
3. TROUBLE FALLING OR STAYING ASLEEP: 0
4. FEELING TIRED OR HAVING LITTLE ENERGY: 0
SUM OF ALL RESPONSES TO PHQ9 QUESTIONS 1 & 2: 2
1. LITTLE INTEREST OR PLEASURE IN DOING THINGS: 1
SUM OF ALL RESPONSES TO PHQ QUESTIONS 1-9: 2
6. FEELING BAD ABOUT YOURSELF - OR THAT YOU ARE A FAILURE OR HAVE LET YOURSELF OR YOUR FAMILY DOWN: 0
10. IF YOU CHECKED OFF ANY PROBLEMS, HOW DIFFICULT HAVE THESE PROBLEMS MADE IT FOR YOU TO DO YOUR WORK, TAKE CARE OF THINGS AT HOME, OR GET ALONG WITH OTHER PEOPLE: 0
2. FEELING DOWN, DEPRESSED OR HOPELESS: 1
8. MOVING OR SPEAKING SO SLOWLY THAT OTHER PEOPLE COULD HAVE NOTICED. OR THE OPPOSITE, BEING SO FIGETY OR RESTLESS THAT YOU HAVE BEEN MOVING AROUND A LOT MORE THAN USUAL: 0
9. THOUGHTS THAT YOU WOULD BE BETTER OFF DEAD, OR OF HURTING YOURSELF: 0

## 2023-02-24 ASSESSMENT — ENCOUNTER SYMPTOMS
ABDOMINAL DISTENTION: 0
CONSTIPATION: 0
BLOOD IN STOOL: 0
COUGH: 0
SHORTNESS OF BREATH: 0
GASTROINTESTINAL NEGATIVE: 1
STRIDOR: 0
WHEEZING: 0

## 2023-02-24 NOTE — PROGRESS NOTES
Attending Physician Statement  I have discussed the care of Christiano Soler including pertinent history and exam findings,  with the resident. I have reviewed the key elements of all parts of the encounter with the resident. I agree with the assessment, plan and orders as documented by the resident.   (GE Modifier)    MD IAN East  Attending Physician, [address] [place] [address] Internal Medicine Residency Program  [address] [place] [address]  2/24/2023, 1:23 PM

## 2023-02-24 NOTE — PROGRESS NOTES
[facility]  [facility] [address] [address]  Dept: [phone]  Dept Fax: [phone]    Office Progress/Follow Up Note  Date ofpatient's visit: 2/24/2023  Patient's Name:  Lia Bautista YOB: 1960            Patient Care Team:  Jeovany Jacome MD as PCP - General (Internal Medicine)  ================================================================    REASON FOR VISIT/CHIEF COMPLAINT:  Establish Care (NTP  former patient of Dr Maame Simmons) and Hypertension    HISTORY OF PRESENTING ILLNESS:  History was obtained from: patient, electronic medical record. Rochelle lentz 57 y.o. is here for a 6 months follow-up for hypertension. This visit is a self-pay visit. Last seen by me in 5/31/2022. Has longstanding hypertension well-controlled on current regimen of Prinzide 20/25 mg daily, amlodipine 10 mg. Has been checking her ambulatory blood pressure readings at home average around 125-130 as per patient. In office today is at 138/80 she feels nervous and anxious about being late to work. Denies any symptoms of chest pain, shortness of breath, headache. Reports compliance and no significant side effects from medication. Has good diet control, compliant with medication. Denies any side effects with amlodipine, labs showed hypokalemia at 3.7 denies any cramps or palpitations, leg swelling    She has been smoking continuously a pack of cigarettes every 2 to 3 days. has history of mild asthma on rescue inhaler. Patient has been advised to cut down on smoking which she agrees and she is qualifying for CT lung cancer screening, patient indicates that she does not have the money for the CT image and wants to hold off on it for now. Hyperlipidemia on statin compliant with medication     History of prediabetes in the past last A1c 5.7 now in office it is 5.6, unremarkable CMP in March 2022.     Health maintenance patient has refused all the vaccines and wants to think of the Pap smear in the next office visit. Refused colonoscopy due to financial problems. Patient indicates she will go into Medicare by me and she will get all of her lab work-up and pending screening when she gets Medicare. Patient Active Problem List   Diagnosis    Essential hypertension well controlled    Mixed hyperlipidemia    Mild intermittent asthma without complication    Pre-diabetes    Smoking greater than 20 pack years    Sigmoid diverticulosis    Chronic bilateral low back pain with left-sided sciatica    Marijuana smoker       Health Maintenance Due   Topic Date Due    Cervical cancer screen  Never done    Breast cancer screen  01/30/2019    COVID-19 Vaccine (3 - Booster for Moderna series) 08/04/2021    Colorectal Cancer Screen  03/16/2022    Lipids  03/29/2022    Flu vaccine (1) 08/01/2022       No Known Allergies      Current Outpatient Medications   Medication Sig Dispense Refill    lisinopril-hydroCHLOROthiazide (PRINZIDE;ZESTORETIC) 20-25 MG per tablet Take 1 tablet by mouth daily 30 tablet 0    albuterol sulfate HFA (PROVENTIL;VENTOLIN;PROAIR) 108 (90 Base) MCG/ACT inhaler inhale 2 puffs by mouth every 6 hours if needed for wheezing 8.5 g 1    amLODIPine (NORVASC) 10 MG tablet take 1 tablet by mouth once daily 30 tablet 5    acetaminophen (TYLENOL) 500 MG tablet Take 1 tablet by mouth 4 times daily as needed for Pain 120 tablet 0    pravastatin (PRAVACHOL) 40 MG tablet take 1 tablet by mouth at bedtime 120 tablet 3    Blood Pressure KIT Use to monitor blood pressure daily and as needed 1 kit 0     No current facility-administered medications for this visit. Social History     Tobacco Use    Smoking status: Every Day     Packs/day: 0.50     Years: 35.00     Pack years: 17.50     Types: Cigarettes    Smokeless tobacco: Never   Vaping Use    Vaping Use: Never used   Substance Use Topics    Alcohol use: No     Comment: recovering alcoholic    Drug use:  Yes Comment: amari occassionally       Family History   Problem Relation Age of Onset    Heart Disease Mother     Lung Cancer Mother 54    Heart Disease Father     Stroke Father         REVIEW OF SYSTEMS:    Review of Systems   Constitutional: Negative. Respiratory:  Negative for cough, shortness of breath, wheezing and stridor. Cardiovascular: Negative. Negative for chest pain, palpitations and leg swelling. Gastrointestinal: Negative. Negative for abdominal distention, blood in stool and constipation. Psychiatric/Behavioral: Negative. PHYSICAL EXAM:  Vitals:    02/24/23 0959 02/24/23 1013   BP: (!) 140/91 138/80   Pulse: 88    Temp: 97 °F (36.1 °C)    TempSrc: Infrared    SpO2: 95%    Weight: 140 lb 3.2 oz (63.6 kg)    Height: 5' 1\" (1.549 m)      BP Readings from Last 3 Encounters:   02/24/23 138/80   05/31/22 119/75   03/30/22 (!) 167/93        Physical Exam  Constitutional:       Appearance: Normal appearance. HENT:      Head: Normocephalic and atraumatic. Cardiovascular:      Rate and Rhythm: Normal rate and regular rhythm. Pulses: Normal pulses. Heart sounds: Normal heart sounds. Pulmonary:      Effort: Pulmonary effort is normal.      Breath sounds: Normal breath sounds. Abdominal:      General: Abdomen is flat. Palpations: Abdomen is soft. Neurological:      General: No focal deficit present. Mental Status: She is alert and oriented to person, place, and time.    Psychiatric:         Mood and Affect: Mood normal.         Behavior: Behavior normal.         DIAGNOSTIC FINDINGS:  CBC:  Lab Results   Component Value Date/Time    WBC 10.6 03/09/2022 02:49 PM    HGB 13.1 03/09/2022 02:49 PM     03/09/2022 02:49 PM       BMP:    Lab Results   Component Value Date/Time     03/09/2022 02:49 PM    K 3.7 03/09/2022 02:49 PM     03/09/2022 02:49 PM    CO2 24 03/09/2022 02:49 PM    BUN 16 03/09/2022 02:49 PM    CREATININE 0.78 03/09/2022 02:49 PM GLUCOSE 80 03/09/2022 02:49 PM    GLUCOSE 92 04/10/2012 11:43 AM       HEMOGLOBIN A1C:   Lab Results   Component Value Date/Time    LABA1C 5.6 02/24/2023 09:49 AM       FASTING LIPID PANEL:  Lab Results   Component Value Date    CHOL 214 (H) 03/29/2021    HDL 47 03/29/2021    TRIG 265 (H) 03/29/2021       ASSESSMENT AND PLAN:  Fabiano Cannon was seen today for establish care and hypertension. Diagnoses and all orders for this visit:    Essential hypertension well controlled    Pre-diabetes  -     POCT glycosylated hemoglobin (Hb A1C)    Mixed hyperlipidemia    Smoking greater than 20 pack years    Mild intermittent asthma without complication    Encounter for screening mammogram for malignant neoplasm of breast    Colon cancer screening        FOLLOW UP AND INSTRUCTIONS:  Return in about 3 months (around 5/24/2023) for hypertension  and Pap smear. Fabiano Cannon received counseling on the following healthy behaviors: medication adherence and tobacco cessation    Discussed use, benefit, and side effects of prescribed medications. Barriers to medication compliance addressed. All patient questions answered. Pt voiced understanding. Patient given educational materials - see patient instructions    Long Zeng MD  Internal Medicine Resident, PGY- [address] [place], [place]  2/24/2023, 11:40 AM      This note is created with the assistance of a speech-recognition program. While intending to generate a document that actually reflects the content of thevisit, the document can still have some mistakes which may not have been identified and corrected by editing.

## 2023-02-24 NOTE — PATIENT INSTRUCTIONS
Hypertension Instructions :     Learn to measure your own blood pressure. Keep a record Blood pressure log of your results and bring it to the office on next visit , we will go through the readings . Take your blood pressure medicine exactly as directed. Dont skip doses. Missing doses can cause your blood pressure to get out of control. Stay at a healthy weight. Get help to lose any extra pounds . Cut back on salt. ideal amount of sodium is no more than 1,500 mg a day. Follow the DASH (Dietary Approaches to Stop Hypertension) eating plan. This plan recommends vegetables, fruits, whole gains. Begin an exercise program. Simple activities such as walking can help. Limit drinks that contain caffeine such as coffee, black or green tea, and cola to 2 per day. Limit alcohol intake if any. Call us right away if you have any of the following:  Chest pain or shortness of breath.   Moderate to severe headache  Weakness in the muscles of your face, arms, or legs  Trouble speaking  Extreme drowsiness  Confusion  Fainting or dizziness  Pulsating or rushing sound in your ears  Unexplained nosebleed  Weakness, tingling, or numbness of your face, arms, or legs  Change in vision  Blood pressure measured at home that is greater than 180/110

## 2023-02-27 ENCOUNTER — TELEPHONE (OUTPATIENT)
Dept: SURGERY | Age: 63
End: 2023-02-27

## 2023-08-09 DIAGNOSIS — I10 ESSENTIAL HYPERTENSION: Chronic | ICD-10-CM

## 2023-08-11 RX ORDER — LISINOPRIL AND HYDROCHLOROTHIAZIDE 25; 20 MG/1; MG/1
TABLET ORAL
Qty: 30 TABLET | Refills: 3 | Status: SHIPPED | OUTPATIENT
Start: 2023-08-11

## 2023-10-19 DIAGNOSIS — E78.2 MIXED HYPERLIPIDEMIA: Chronic | ICD-10-CM

## 2023-10-19 NOTE — TELEPHONE ENCOUNTER
.. Request for   Requested Prescriptions     Pending Prescriptions Disp Refills    pravastatin (PRAVACHOL) 40 MG tablet [Pharmacy Med Name: PRAVASTATIN SODIUM 40 MG TAB] 120 tablet 1     Sig: take 1 tablet by mouth at bedtime    . Please review and e-scribe to pharmacy listed in chart if appropriate. Thank you.       Last Visit Date: 2/24/2023  Next Visit Date: 10/31/2023    Future Appointments   Date Time Provider [address]   10/31/2023  3:00 PM Niles Velasquez MD [facility] [address] [facility] Maintenance   Topic Date Due    Cervical cancer screen  Never done    Shingles vaccine (1 of 2) Never done    DEXA (modify frequency per FRAX score)  Never done    Pneumococcal 65+ years Vaccine (2 - PCV) 10/30/2018    Breast cancer screen  01/30/2019    COVID-19 Vaccine (3 - Moderna series) 08/04/2021    Colorectal Cancer Screen  03/16/2022    Lipids  03/29/2022    Flu vaccine (1) 08/01/2023    A1C test (Diabetic or Prediabetic)  02/24/2024    Depression Screen  02/24/2024    DTaP/Tdap/Td vaccine (2 - Td or Tdap) 09/08/2024    Hepatitis C screen  Completed    HIV screen  Completed    Hepatitis A vaccine  Aged Out    Hepatitis B vaccine  Aged Out    Hib vaccine  Aged Out    Meningococcal (ACWY) vaccine  Aged Out    Depression Monitoring  Discontinued    Pneumococcal 0-64 years Vaccine  Discontinued       Hemoglobin A1C (%)   Date Value   02/24/2023 5.6   12/15/2021 5.5   03/29/2021 5.7             ( goal A1C is < 7)   No components found for: \"LABMICR\"  LDL Cholesterol (mg/dL)   Date Value   03/29/2021 114       (goal LDL is <100)   AST (U/L)   Date Value   03/09/2022 32 (H)     ALT (U/L)   Date Value   03/09/2022 17     BUN (mg/dL)   Date Value   03/09/2022 16     BP Readings from Last 3 Encounters:   02/24/23 138/80   05/31/22 119/75   03/30/22 (!) 167/93          (goal 120/80)    All Future Testing planned in [facility]  Lab Frequency Next Occurrence   LAURA DIGITAL SCREEN W OR WO CAD BILATERAL Once 05/24/2023

## 2023-10-22 RX ORDER — PRAVASTATIN SODIUM 40 MG
TABLET ORAL
Qty: 120 TABLET | Refills: 1 | Status: SHIPPED | OUTPATIENT
Start: 2023-10-22

## 2023-11-25 DIAGNOSIS — Z12.11 COLON CANCER SCREENING: ICD-10-CM

## 2023-11-27 RX ORDER — AMLODIPINE BESYLATE 10 MG/1
TABLET ORAL
Qty: 30 TABLET | Refills: 5 | Status: SHIPPED | OUTPATIENT
Start: 2023-11-27

## 2023-11-27 NOTE — TELEPHONE ENCOUNTER
Kevan Buck Request for   Requested Prescriptions     Pending Prescriptions Disp Refills    amLODIPine (NORVASC) 10 MG tablet [Pharmacy Med Name: AMLODIPINE BESYLATE 10 MG TAB] 30 tablet 5     Sig: take 1 tablet by mouth once daily    . Please review and e-scribe to pharmacy listed in chart if appropriate. Thank you.       Last Visit Date: 2/24/2023  Next Visit Date: 12/26/2023    Future Appointments   Date Time Provider [address]   12/26/2023  2:30 PM Dion Amaro MD [facility] Maintenance   Topic Date Due    Cervical cancer screen  Never done    DEXA (modify frequency per FRAX score)  Never done    Pneumococcal 65+ years Vaccine (2 - PCV) 10/30/2018    Breast cancer screen  01/30/2019    Colorectal Cancer Screen  03/16/2022    Lipids  03/29/2022    Flu vaccine (1) 08/01/2023    COVID-19 Vaccine (3 - 2023-24 season) 09/01/2023    Shingles vaccine (1 of 2) 10/30/2024 (Originally 4/12/2008)    A1C test (Diabetic or Prediabetic)  02/24/2024    Depression Screen  02/24/2024    DTaP/Tdap/Td vaccine (2 - Td or Tdap) 09/08/2024    Hepatitis C screen  Completed    HIV screen  Completed    Hepatitis A vaccine  Aged Out    Hepatitis B vaccine  Aged Out    Hib vaccine  Aged Out    Meningococcal (ACWY) vaccine  Aged Out    Depression Monitoring  Discontinued    Pneumococcal 0-64 years Vaccine  Discontinued       Hemoglobin A1C (%)   Date Value   02/24/2023 5.6   12/15/2021 5.5   03/29/2021 5.7             ( goal A1C is < 7)   No components found for: \"LABMICR\"  LDL Cholesterol (mg/dL)   Date Value   03/29/2021 114       (goal LDL is <100)   AST (U/L)   Date Value   03/09/2022 32 (H)     ALT (U/L)   Date Value   03/09/2022 17     BUN (mg/dL)   Date Value   03/09/2022 16     BP Readings from Last 3 Encounters:   02/24/23 138/80   05/31/22 119/75   03/30/22 (!) 167/93          (goal 120/80)    All Future Testing planned in [facility]  Lab Frequency Next Occurrence   LAURA DIGITAL SCREEN W OR WO CAD BILATERAL

## 2023-12-28 DIAGNOSIS — I10 ESSENTIAL HYPERTENSION: Chronic | ICD-10-CM

## 2023-12-28 RX ORDER — LISINOPRIL AND HYDROCHLOROTHIAZIDE 25; 20 MG/1; MG/1
TABLET ORAL
Qty: 30 TABLET | Refills: 3 | Status: SHIPPED | OUTPATIENT
Start: 2023-12-28

## 2023-12-28 NOTE — TELEPHONE ENCOUNTER
.. Request for   Requested Prescriptions     Pending Prescriptions Disp Refills    lisinopril-hydroCHLOROthiazide (PRINZIDE;ZESTORETIC) 20-25 MG per tablet [Pharmacy Med Name: LISINOPRIL-HCTZ 20-25 MG TAB] 30 tablet 3     Sig: take 1 tablet by mouth once daily    . Please review and e-scribe to pharmacy listed in chart if appropriate. Thank you.       Last Visit Date: 2/24/2023  Next Visit Date: 1/9/2024    Future Appointments   Date Time Provider [address]   1/9/2024  1:00 PM Soy Antonio MD [address] [facility] Maintenance   Topic Date Due    Cervical cancer screen  Never done    DEXA (modify frequency per FRAX score)  Never done    Respiratory Syncytial Virus (RSV) Pregnant or age 61 yrs+ (3 - 1-dose 60+ series) Never done    Pneumococcal 65+ years Vaccine (2 - PCV) 10/30/2018    Breast cancer screen  01/30/2019    Colorectal Cancer Screen  03/16/2022    Lipids  03/29/2022    Flu vaccine (1) 08/01/2023    COVID-19 Vaccine (3 - 2023-24 season) 09/01/2023    Shingles vaccine (1 of 2) 10/30/2024 (Originally 4/12/2008)    A1C test (Diabetic or Prediabetic)  02/24/2024    Depression Screen  02/24/2024    DTaP/Tdap/Td vaccine (2 - Td or Tdap) 09/08/2024    Hepatitis C screen  Completed    HIV screen  Completed    Hepatitis A vaccine  Aged Out    Hepatitis B vaccine  Aged Out    Hib vaccine  Aged Out    Polio vaccine  Aged Out    Meningococcal (ACWY) vaccine  Aged Out    Depression Monitoring  Discontinued    Pneumococcal 0-64 years Vaccine  Discontinued       Hemoglobin A1C (%)   Date Value   02/24/2023 5.6   12/15/2021 5.5   03/29/2021 5.7             ( goal A1C is < 7)   No components found for: \"LABMICR\"  LDL Cholesterol (mg/dL)   Date Value   03/29/2021 114       (goal LDL is <100)   AST (U/L)   Date Value   03/09/2022 32 (H)     ALT (U/L)   Date Value   03/09/2022 17     BUN (mg/dL)   Date Value   03/09/2022 16     BP Readings from Last 3 Encounters:   02/24/23 138/80   05/31/22 119/75

## 2024-02-06 ENCOUNTER — OFFICE VISIT (OUTPATIENT)
Dept: INTERNAL MEDICINE | Age: 64
End: 2024-02-06

## 2024-02-06 VITALS
WEIGHT: 151.6 LBS | BODY MASS INDEX: 28.62 KG/M2 | HEIGHT: 61 IN | SYSTOLIC BLOOD PRESSURE: 187 MMHG | TEMPERATURE: 97.3 F | DIASTOLIC BLOOD PRESSURE: 100 MMHG | HEART RATE: 88 BPM | OXYGEN SATURATION: 98 %

## 2024-02-06 DIAGNOSIS — I10 UNCONTROLLED HYPERTENSION: Primary | ICD-10-CM

## 2024-02-06 DIAGNOSIS — Z78.0 POST-MENOPAUSAL: ICD-10-CM

## 2024-02-06 DIAGNOSIS — J45.20 MILD INTERMITTENT ASTHMA WITHOUT COMPLICATION: ICD-10-CM

## 2024-02-06 DIAGNOSIS — F32.A MILD DEPRESSION: ICD-10-CM

## 2024-02-06 DIAGNOSIS — R51.9 NONINTRACTABLE HEADACHE, UNSPECIFIED CHRONICITY PATTERN, UNSPECIFIED HEADACHE TYPE: ICD-10-CM

## 2024-02-06 PROCEDURE — 1123F ACP DISCUSS/DSCN MKR DOCD: CPT

## 2024-02-06 PROCEDURE — 99213 OFFICE O/P EST LOW 20 MIN: CPT

## 2024-02-06 PROCEDURE — 3077F SYST BP >= 140 MM HG: CPT

## 2024-02-06 PROCEDURE — 3080F DIAST BP >= 90 MM HG: CPT

## 2024-02-06 RX ORDER — LISINOPRIL 40 MG/1
40 TABLET ORAL DAILY
Qty: 90 TABLET | Refills: 1 | Status: SHIPPED | OUTPATIENT
Start: 2024-02-06

## 2024-02-06 RX ORDER — ALBUTEROL SULFATE 90 UG/1
AEROSOL, METERED RESPIRATORY (INHALATION)
Qty: 8.5 G | Refills: 5 | Status: SHIPPED | OUTPATIENT
Start: 2024-02-06

## 2024-02-06 RX ORDER — ACETAMINOPHEN 500 MG
500 TABLET ORAL 4 TIMES DAILY PRN
Qty: 120 TABLET | Refills: 0 | Status: SHIPPED | OUTPATIENT
Start: 2024-02-06

## 2024-02-06 RX ORDER — FLUOXETINE HYDROCHLORIDE 20 MG/1
20 CAPSULE ORAL DAILY
Qty: 30 CAPSULE | Refills: 3 | Status: SHIPPED | OUTPATIENT
Start: 2024-02-06

## 2024-02-06 RX ORDER — LISINOPRIL AND HYDROCHLOROTHIAZIDE 25; 20 MG/1; MG/1
1 TABLET ORAL DAILY
Qty: 30 TABLET | Refills: 5 | Status: CANCELLED | OUTPATIENT
Start: 2024-02-06

## 2024-02-06 RX ORDER — HYDROCHLOROTHIAZIDE 25 MG/1
25 TABLET ORAL EVERY MORNING
Qty: 90 TABLET | Refills: 1 | Status: SHIPPED | OUTPATIENT
Start: 2024-02-06

## 2024-02-06 RX ORDER — AMLODIPINE BESYLATE 10 MG/1
10 TABLET ORAL DAILY
Qty: 30 TABLET | Refills: 5 | Status: SHIPPED | OUTPATIENT
Start: 2024-02-06

## 2024-02-06 SDOH — ECONOMIC STABILITY: FOOD INSECURITY: WITHIN THE PAST 12 MONTHS, THE FOOD YOU BOUGHT JUST DIDN'T LAST AND YOU DIDN'T HAVE MONEY TO GET MORE.: OFTEN TRUE

## 2024-02-06 SDOH — ECONOMIC STABILITY: FOOD INSECURITY: WITHIN THE PAST 12 MONTHS, YOU WORRIED THAT YOUR FOOD WOULD RUN OUT BEFORE YOU GOT MONEY TO BUY MORE.: OFTEN TRUE

## 2024-02-06 SDOH — ECONOMIC STABILITY: HOUSING INSECURITY
IN THE LAST 12 MONTHS, WAS THERE A TIME WHEN YOU DID NOT HAVE A STEADY PLACE TO SLEEP OR SLEPT IN A SHELTER (INCLUDING NOW)?: NO

## 2024-02-06 SDOH — ECONOMIC STABILITY: INCOME INSECURITY: HOW HARD IS IT FOR YOU TO PAY FOR THE VERY BASICS LIKE FOOD, HOUSING, MEDICAL CARE, AND HEATING?: HARD

## 2024-02-06 ASSESSMENT — COLUMBIA-SUICIDE SEVERITY RATING SCALE - C-SSRS
1. WITHIN THE PAST MONTH, HAVE YOU WISHED YOU WERE DEAD OR WISHED YOU COULD GO TO SLEEP AND NOT WAKE UP?: NO
6. HAVE YOU EVER DONE ANYTHING, STARTED TO DO ANYTHING, OR PREPARED TO DO ANYTHING TO END YOUR LIFE?: NO
2. HAVE YOU ACTUALLY HAD ANY THOUGHTS OF KILLING YOURSELF?: NO

## 2024-02-06 ASSESSMENT — PATIENT HEALTH QUESTIONNAIRE - PHQ9
SUM OF ALL RESPONSES TO PHQ QUESTIONS 1-9: 9
5. POOR APPETITE OR OVEREATING: 1
4. FEELING TIRED OR HAVING LITTLE ENERGY: 1
SUM OF ALL RESPONSES TO PHQ QUESTIONS 1-9: 9
10. IF YOU CHECKED OFF ANY PROBLEMS, HOW DIFFICULT HAVE THESE PROBLEMS MADE IT FOR YOU TO DO YOUR WORK, TAKE CARE OF THINGS AT HOME, OR GET ALONG WITH OTHER PEOPLE: 1
9. THOUGHTS THAT YOU WOULD BE BETTER OFF DEAD, OR OF HURTING YOURSELF: 1
6. FEELING BAD ABOUT YOURSELF - OR THAT YOU ARE A FAILURE OR HAVE LET YOURSELF OR YOUR FAMILY DOWN: 1
SUM OF ALL RESPONSES TO PHQ QUESTIONS 1-9: 9
2. FEELING DOWN, DEPRESSED OR HOPELESS: 2
SUM OF ALL RESPONSES TO PHQ QUESTIONS 1-9: 8
8. MOVING OR SPEAKING SO SLOWLY THAT OTHER PEOPLE COULD HAVE NOTICED. OR THE OPPOSITE, BEING SO FIGETY OR RESTLESS THAT YOU HAVE BEEN MOVING AROUND A LOT MORE THAN USUAL: 0
7. TROUBLE CONCENTRATING ON THINGS, SUCH AS READING THE NEWSPAPER OR WATCHING TELEVISION: 1
1. LITTLE INTEREST OR PLEASURE IN DOING THINGS: 1
SUM OF ALL RESPONSES TO PHQ9 QUESTIONS 1 & 2: 3

## 2024-02-06 ASSESSMENT — ENCOUNTER SYMPTOMS
WHEEZING: 0
COUGH: 1
SHORTNESS OF BREATH: 0

## 2024-02-06 NOTE — PROGRESS NOTES
UTILITIES      RESOURCE SERVICE PHONE WEB   [facility] [place], [facility] [employer], Water Payment Assistance  (942) [phone] ext: 413  Service-Intake - HIV Testing www.[facility].org   [facility] Electric, Gas Service, Water Payment Assistance  (326) 254-3892  Service-Intake www.epiph[facility].org   [place] [employer] Service Payment Assistance (134) 571-9060  Service-Intake N/A   [facility] Electric and Gas Service Payment Assistance (121) 416-1696  Service-Intake [email or web address]   [employer] [facility] Electric, Gas Service, Water Payment Assistance  (554) 828-8175  Service-Intake - and Fax [employer]   [employer], Electric, Gas and Water Service Payment Assistance (053) 797-4222 ext: 1  [facility]oll Free [facility].org/Chapters/[place]   [facility] [employer], [employer] Service, Water Payment Assistance  (761) 561-8389  Service-Intake www.ovarianconnection.org   [employer], Electric, Gas and Water Service Payment Assistance (841) 240-0610 ext: x11  Service-Intake www.pathway[place].org   [facility] [employer], Electric, Gas and Water Service Payment Assistance (388) 323-4920  Service-Intake www.[facility].org   [facility] [employer], Gas Service, Water Payment Assistance  (233) 899-9273  Service-Intake [facility]   [place] [employer] Electric, Gas Service, Water Payment Assistance  (516) 207-6864  Service-Intake N/A   [facility] Electric, Gas Service, Water Payment Assistance  ([phone] 861-7954  Administrative [email or web address]     Updated 1/30/20

## 2024-02-06 NOTE — PROGRESS NOTES
Attending Physician Statement  I have discussed the care of Monserrat Garland, including pertinent history and exam findings with the resident. I have reviewed the key elements of all parts of the encounter with the resident. I agree with the assessment, and status of the problem list as documented and this was also documented by the resident. .The medication list was reviewed with the resident and is up to date. The return visit should be in 3 months .  Patient could not afford meds because of cost. Prinzide discontinued.   Lisinopril and HCTZ and Norvasc  script printed and good Rx coupon given  Advised to get from 4 dollar list from any pharmacy   follow up in 4 weeks       Diagnosis Orders   1. Uncontrolled hypertension  amLODIPine (NORVASC) 10 MG tablet    lisinopril (PRINIVIL;ZESTRIL) 40 MG tablet    hydroCHLOROthiazide (HYDRODIURIL) 25 MG tablet    Basic Metabolic Panel      2. Mild depression  FLUoxetine (PROZAC) 20 MG capsule    Larissa Ambrocio Ed.D., Psychology, [place]      3. Mild intermittent asthma without complication  albuterol sulfate HFA (PROVENTIL;VENTOLIN;PROAIR) 108 (90 Base) MCG/ACT inhaler      4. Nonintractable headache, unspecified chronicity pattern, unspecified headache type  acetaminophen (TYLENOL) 500 MG tablet      5. Post-menopausal  DEXA BONE DENSITY 2 SITES           Lina Jeff MD   Attending Physician, [facility]   Faculty, Internal Medicine Residency Program  [facility]

## 2024-02-06 NOTE — PROGRESS NOTES
[facility]  [facility]  [address] [place] [address]  Dept: [phone]  Dept Fax: [phone]    Office Progress/Follow Up Note  Date ofpatient's visit: 2/6/2024  Patient's Name:  Monserrat Garland YOB: 1960            Patient Care Team:  Piero Abraham MD as PCP - General (Internal Medicine)  ================================================================    REASON FOR VISIT/CHIEF COMPLAINT:  Hypertension (Pt did not take medication, pt states of cost, pt having headache, x 2 week)    HISTORY OF PRESENTING ILLNESS:  History was obtained from: patient. Monserrat Burno a 63 y.o. is here for a follow-up for hypertension.  Patient's initial blood pressure was 216/121.  Repeat blood pressure was 187/100.  Patient states that she has ran out of her medication and no longer can afford her medication and has not taken her medication in the last week.  Patient denies any headaches, tinnitus, shortness of breath, chest pain or lower extremity swelling.  Patient was very tearful upon examination stating that \"life is hard and she has a lot of daily stressors \".  PHQ-9 was 9 consistent with mild depression.  Patient denied any suicidal or homicidal ideation.  Patient also has a history of intermittent asthma without any complications.  She states that she uses her albuterol inhaler roughly 1 puff/day and has not seen an increase in the need of her albuterol for rescue inhaler.      Patient Active Problem List   Diagnosis    Essential hypertension well controlled    Mixed hyperlipidemia    Mild intermittent asthma without complication    Pre-diabetes    Smoking greater than 20 pack years    Sigmoid diverticulosis    Chronic bilateral low back pain with left-sided sciatica    Marijuana smoker       Health Maintenance Due   Topic Date Due    Cervical cancer screen  Never done    DEXA (modify frequency per FRAX score)  Never done    Respiratory Syncytial

## 2024-02-06 NOTE — PROGRESS NOTES
FOOD RESOURCES      RESOURCE SERVICE PHONE WEB   [facility] Food Pantry [phone]  Administrative www.[employer]y.org   [facility] Feed Your Neighbor [phone]  Service-Intake - and Fax N/A   [facility]  Food Pantry [phone] N/A   [facility] Feed Your Neighbor [phone]  Service-Intake www.[employer]    [facility] Feed Your Neighbor / [place] Meals Program [phone]) [phone]  Service-Intake N/A   [facility] Food Assistance Programs [phone] [phone]  Service-Intake www.[facility] Hospitalamil[facility]ervices.org    [facility] Feed Your Neighbor [phone]  Service-Intake N/A   [facility] [employer] Feed Your Neighbor [phone]  Service-Intake N/A   Humber[facility] Feed Your Neighbor [phone]  Service-Intake N/A   [facility] Food Pantry [phone] [phone]  Service-Intake www.salvationarmynwohio.org    [facility] Emergency Food Pantry [phone] [phone]  Service-Intake www.[facility].org    [facility] Community Lunch [phone] 593-8071  Service-Intake www.[facility].org      [facility] Food Pantry [phone]  Administrative www.[employer].org    [facility] Food Bank Back Pack Program [phone]) [phone]  Service-Intake www.[employer].org   [facility] [place] Hot Meals Program [phone] 734-6871  Service-Intake - Information and Shelter for men www.[facility] Barrowosprescuemission.org    [facility] Social and Economic Empowerment Feed Your Neighbor [phone]  Service-Intake www.provideBarkBoxntertoledo.org    [employer] Feed Your Neighbor [phone]  Service-Intake www.[employer]    [facility] [place] [employer] [facility] Emergency Assistance [phone]  Service-Intake N/A   [facility] Feed Your Neighbor [phone]

## 2024-03-15 ENCOUNTER — TELEPHONE (OUTPATIENT)
Dept: INTERNAL MEDICINE | Age: 64
End: 2024-03-15

## 2024-03-15 NOTE — TELEPHONE ENCOUNTER
Writer reached out to the patient regarding recent request for financial, food, housing, and transportation resources. Patient is interested in the list. Writer will mail the document to the patient today.    Electronically Signed by  Mariano Almazan  Practice Manager

## 2024-03-26 ENCOUNTER — OFFICE VISIT (OUTPATIENT)
Dept: INTERNAL MEDICINE | Age: 64
End: 2024-03-26

## 2024-03-26 VITALS
SYSTOLIC BLOOD PRESSURE: 128 MMHG | HEIGHT: 61 IN | RESPIRATION RATE: 16 BRPM | DIASTOLIC BLOOD PRESSURE: 80 MMHG | OXYGEN SATURATION: 96 % | BODY MASS INDEX: 26.24 KG/M2 | TEMPERATURE: 97.7 F | WEIGHT: 139 LBS | HEART RATE: 91 BPM

## 2024-03-26 DIAGNOSIS — I10 ESSENTIAL HYPERTENSION: ICD-10-CM

## 2024-03-26 DIAGNOSIS — F32.A DEPRESSION, UNSPECIFIED DEPRESSION TYPE: ICD-10-CM

## 2024-03-26 DIAGNOSIS — R20.0 HAND NUMBNESS: Primary | ICD-10-CM

## 2024-03-26 PROCEDURE — 1123F ACP DISCUSS/DSCN MKR DOCD: CPT | Performed by: STUDENT IN AN ORGANIZED HEALTH CARE EDUCATION/TRAINING PROGRAM

## 2024-03-26 PROCEDURE — 1090F PRES/ABSN URINE INCON ASSESS: CPT | Performed by: STUDENT IN AN ORGANIZED HEALTH CARE EDUCATION/TRAINING PROGRAM

## 2024-03-26 PROCEDURE — 4004F PT TOBACCO SCREEN RCVD TLK: CPT | Performed by: STUDENT IN AN ORGANIZED HEALTH CARE EDUCATION/TRAINING PROGRAM

## 2024-03-26 PROCEDURE — G8427 DOCREV CUR MEDS BY ELIG CLIN: HCPCS | Performed by: STUDENT IN AN ORGANIZED HEALTH CARE EDUCATION/TRAINING PROGRAM

## 2024-03-26 PROCEDURE — G8484 FLU IMMUNIZE NO ADMIN: HCPCS | Performed by: STUDENT IN AN ORGANIZED HEALTH CARE EDUCATION/TRAINING PROGRAM

## 2024-03-26 PROCEDURE — 3074F SYST BP LT 130 MM HG: CPT | Performed by: STUDENT IN AN ORGANIZED HEALTH CARE EDUCATION/TRAINING PROGRAM

## 2024-03-26 PROCEDURE — G8419 CALC BMI OUT NRM PARAM NOF/U: HCPCS | Performed by: STUDENT IN AN ORGANIZED HEALTH CARE EDUCATION/TRAINING PROGRAM

## 2024-03-26 PROCEDURE — 99213 OFFICE O/P EST LOW 20 MIN: CPT | Performed by: STUDENT IN AN ORGANIZED HEALTH CARE EDUCATION/TRAINING PROGRAM

## 2024-03-26 PROCEDURE — 99212 OFFICE O/P EST SF 10 MIN: CPT | Performed by: STUDENT IN AN ORGANIZED HEALTH CARE EDUCATION/TRAINING PROGRAM

## 2024-03-26 PROCEDURE — G8400 PT W/DXA NO RESULTS DOC: HCPCS | Performed by: STUDENT IN AN ORGANIZED HEALTH CARE EDUCATION/TRAINING PROGRAM

## 2024-03-26 PROCEDURE — 3017F COLORECTAL CA SCREEN DOC REV: CPT | Performed by: STUDENT IN AN ORGANIZED HEALTH CARE EDUCATION/TRAINING PROGRAM

## 2024-03-26 PROCEDURE — 3079F DIAST BP 80-89 MM HG: CPT | Performed by: STUDENT IN AN ORGANIZED HEALTH CARE EDUCATION/TRAINING PROGRAM

## 2024-03-26 RX ORDER — SERTRALINE HYDROCHLORIDE 25 MG/1
25 TABLET, FILM COATED ORAL DAILY
Qty: 30 TABLET | Refills: 0 | Status: SHIPPED | OUTPATIENT
Start: 2024-03-26

## 2024-03-26 ASSESSMENT — PATIENT HEALTH QUESTIONNAIRE - PHQ9
1. LITTLE INTEREST OR PLEASURE IN DOING THINGS: MORE THAN HALF THE DAYS
4. FEELING TIRED OR HAVING LITTLE ENERGY: SEVERAL DAYS
5. POOR APPETITE OR OVEREATING: SEVERAL DAYS
10. IF YOU CHECKED OFF ANY PROBLEMS, HOW DIFFICULT HAVE THESE PROBLEMS MADE IT FOR YOU TO DO YOUR WORK, TAKE CARE OF THINGS AT HOME, OR GET ALONG WITH OTHER PEOPLE: SOMEWHAT DIFFICULT
9. THOUGHTS THAT YOU WOULD BE BETTER OFF DEAD, OR OF HURTING YOURSELF: NOT AT ALL
SUM OF ALL RESPONSES TO PHQ QUESTIONS 1-9: 12
SUM OF ALL RESPONSES TO PHQ QUESTIONS 1-9: 12
SUM OF ALL RESPONSES TO PHQ9 QUESTIONS 1 & 2: 4
7. TROUBLE CONCENTRATING ON THINGS, SUCH AS READING THE NEWSPAPER OR WATCHING TELEVISION: SEVERAL DAYS
3. TROUBLE FALLING OR STAYING ASLEEP: MORE THAN HALF THE DAYS
8. MOVING OR SPEAKING SO SLOWLY THAT OTHER PEOPLE COULD HAVE NOTICED. OR THE OPPOSITE, BEING SO FIGETY OR RESTLESS THAT YOU HAVE BEEN MOVING AROUND A LOT MORE THAN USUAL: NOT AT ALL
6. FEELING BAD ABOUT YOURSELF - OR THAT YOU ARE A FAILURE OR HAVE LET YOURSELF OR YOUR FAMILY DOWN: NEARLY EVERY DAY
2. FEELING DOWN, DEPRESSED OR HOPELESS: MORE THAN HALF THE DAYS
SUM OF ALL RESPONSES TO PHQ QUESTIONS 1-9: 12
SUM OF ALL RESPONSES TO PHQ QUESTIONS 1-9: 12

## 2024-03-26 ASSESSMENT — ENCOUNTER SYMPTOMS
SHORTNESS OF BREATH: 0
CONSTIPATION: 0
BLOOD IN STOOL: 0
APNEA: 0
CHOKING: 0
ABDOMINAL DISTENTION: 0
STRIDOR: 0
COUGH: 0
VOMITING: 0
DIARRHEA: 0
NAUSEA: 0
WHEEZING: 0
ABDOMINAL PAIN: 0

## 2024-03-26 ASSESSMENT — ANXIETY QUESTIONNAIRES
2. NOT BEING ABLE TO STOP OR CONTROL WORRYING: NEARLY EVERY DAY
6. BECOMING EASILY ANNOYED OR IRRITABLE: NEARLY EVERY DAY
3. WORRYING TOO MUCH ABOUT DIFFERENT THINGS: NEARLY EVERY DAY
7. FEELING AFRAID AS IF SOMETHING AWFUL MIGHT HAPPEN: NEARLY EVERY DAY
4. TROUBLE RELAXING: NEARLY EVERY DAY
GAD7 TOTAL SCORE: 19
1. FEELING NERVOUS, ANXIOUS, OR ON EDGE: NEARLY EVERY DAY
5. BEING SO RESTLESS THAT IT IS HARD TO SIT STILL: SEVERAL DAYS

## 2024-03-26 NOTE — PROGRESS NOTES
Attending Physician Statement  I have discussed the care of Monserrat Garland, including pertinent history and exam findings with the resident. I have reviewed the key elements of all parts of the encounter with the resident.  I agree with the assessment, and status of the problem list as documented. The plan and orders should include   Orders Placed This Encounter   Procedures    XR HAND LEFT (2 VIEWS)    Electrolyte Panel    Vitamin B12 & Folate    TSH With Reflex Ft4    Rheumatoid Arthritis Diagnostic Panel    Vascular upper arterial PVR with manuevers bilateral    and this was also documented by the resident. The medication list was reviewed with the resident and is up to date.      Diagnosis Orders   1. Hand numbness  Electrolyte Panel    Vitamin B12 & Folate    TSH With Reflex Ft4    Rheumatoid Arthritis Diagnostic Panel    XR HAND LEFT (2 VIEWS)    Vascular upper arterial PVR with manuevers bilateral      2. Essential hypertension well controlled        3. Depression, unspecified depression type  sertraline (ZOLOFT) 25 MG tablet           Lina Jeff MD   Attending Physician, [facility]   Faculty, Internal Medicine Residency Program  [facility] Physician [facility]     
and Rhythm: Normal rate and regular rhythm.      Pulses: Normal pulses.      Heart sounds: Normal heart sounds. No murmur heard.     No friction rub. No gallop.   Pulmonary:      Effort: Pulmonary effort is normal.      Breath sounds: Normal breath sounds.   Abdominal:      General: Abdomen is flat. Bowel sounds are normal. There is no distension.      Palpations: Abdomen is soft. There is no mass.      Tenderness: There is no abdominal tenderness. There is no guarding or rebound.      Hernia: No hernia is present.   Musculoskeletal:         General: Tenderness present. No swelling, deformity or signs of injury.      Right lower leg: No edema.      Left lower leg: No edema.   Skin:     General: Skin is warm and dry.      Coloration: Skin is not jaundiced or pale.      Findings: No bruising.   Neurological:      General: No focal deficit present.      Mental Status: She is alert and oriented to person, place, and time.      Cranial Nerves: No cranial nerve deficit.      Motor: No weakness.   Psychiatric:      Comments: Sad.  Tearful          LABORATORY FINDINGS:    CBC:   Lab Results   Component Value Date/Time    WBC 10.6 03/09/2022 02:49 PM    HGB 13.1 03/09/2022 02:49 PM     03/09/2022 02:49 PM     BMP:    Lab Results   Component Value Date/Time     03/09/2022 02:49 PM    K 3.7 03/09/2022 02:49 PM     03/09/2022 02:49 PM    CO2 24 03/09/2022 02:49 PM    BUN 16 03/09/2022 02:49 PM    CREATININE 0.78 03/09/2022 02:49 PM    GLUCOSE 80 03/09/2022 02:49 PM    GLUCOSE 92 04/10/2012 11:43 AM     Hemoglobin A1C:   Lab Results   Component Value Date/Time    LABA1C 5.6 02/24/2023 09:49 AM     Lipid profile:   Lab Results   Component Value Date/Time    CHOL 214 03/29/2021 01:57 PM    TRIG 265 03/29/2021 01:57 PM    HDL 47 03/29/2021 01:57 PM     Thyroid functions:   Lab Results   Component Value Date/Time    TSH 1.90 07/17/2020 11:56 AM      Hepatic functions:   Lab Results   Component Value Date/Time

## 2024-05-09 DIAGNOSIS — F32.A DEPRESSION, UNSPECIFIED DEPRESSION TYPE: ICD-10-CM

## 2024-05-09 RX ORDER — SERTRALINE HYDROCHLORIDE 25 MG/1
25 TABLET, FILM COATED ORAL DAILY
Qty: 30 TABLET | Refills: 0 | OUTPATIENT
Start: 2024-05-09

## 2024-05-09 NOTE — TELEPHONE ENCOUNTER
I have never seen this patient before.    Piero Abraham MD  Internal Medicine Resident, PGY-2  [facility], [facility]  05/09/24  3:03 PM

## 2024-05-09 NOTE — TELEPHONE ENCOUNTER
Monserrat Garland is calling to request a refill on the following medication(s):    Medication Request:  Requested Prescriptions     Pending Prescriptions Disp Refills    sertraline (ZOLOFT) 25 MG tablet [Pharmacy Med Name: SERTRALINE HCL 25 MG TABLET] 30 tablet 0     Sig: take 1 tablet by mouth once daily       Last Visit Date (If Applicable):  3/26/2024    Next Visit Date:    Visit date not found

## 2024-06-13 DIAGNOSIS — I10 ESSENTIAL HYPERTENSION: Chronic | ICD-10-CM

## 2024-06-14 RX ORDER — LISINOPRIL AND HYDROCHLOROTHIAZIDE 25; 20 MG/1; MG/1
TABLET ORAL
Qty: 30 TABLET | Refills: 5 | OUTPATIENT
Start: 2024-06-14

## 2024-06-14 NOTE — TELEPHONE ENCOUNTER
Monserrat Garland is calling to request a refill on the following medication(s):    Medication Request:  Requested Prescriptions     Pending Prescriptions Disp Refills    lisinopril-hydroCHLOROthiazide (PRINZIDE;ZESTORETIC) 20-25 MG per tablet [Pharmacy Med Name: LISINOPRIL-HCTZ 20-25 MG TAB] 30 tablet 5     Sig: take 1 tablet by mouth once daily       Last Visit Date (If Applicable):  3/26/2024    Next Visit Date:    Visit date not found

## 2024-06-26 DIAGNOSIS — I10 ESSENTIAL HYPERTENSION: Chronic | ICD-10-CM

## 2024-06-26 RX ORDER — LISINOPRIL AND HYDROCHLOROTHIAZIDE 25; 20 MG/1; MG/1
TABLET ORAL
Qty: 30 TABLET | Refills: 3 | OUTPATIENT
Start: 2024-06-26

## 2024-07-13 DIAGNOSIS — I10 UNCONTROLLED HYPERTENSION: ICD-10-CM

## 2024-07-15 RX ORDER — AMLODIPINE BESYLATE 10 MG/1
10 TABLET ORAL DAILY
Qty: 30 TABLET | Refills: 5 | Status: SHIPPED | OUTPATIENT
Start: 2024-07-15

## 2024-07-15 NOTE — TELEPHONE ENCOUNTER
Monserrat Garland is calling to request a refill on the following medication(s):    Medication Request:  Requested Prescriptions     Pending Prescriptions Disp Refills    amLODIPine (NORVASC) 10 MG tablet [Pharmacy Med Name: AMLODIPINE BESYLATE 10 MG TAB] 30 tablet 5     Sig: take 1 tablet by mouth once daily       Last Visit Date (If Applicable):  3/26/2024    Next Visit Date:    Visit date not found

## 2024-07-23 ENCOUNTER — OFFICE VISIT (OUTPATIENT)
Dept: INTERNAL MEDICINE | Age: 64
End: 2024-07-23

## 2024-07-23 VITALS
OXYGEN SATURATION: 98 % | BODY MASS INDEX: 29.11 KG/M2 | WEIGHT: 154.2 LBS | HEIGHT: 61 IN | HEART RATE: 80 BPM | DIASTOLIC BLOOD PRESSURE: 80 MMHG | SYSTOLIC BLOOD PRESSURE: 170 MMHG | TEMPERATURE: 97.7 F

## 2024-07-23 DIAGNOSIS — K57.30 SIGMOID DIVERTICULOSIS: ICD-10-CM

## 2024-07-23 DIAGNOSIS — Z87.891 SMOKING HISTORY: ICD-10-CM

## 2024-07-23 DIAGNOSIS — J45.20 MILD INTERMITTENT ASTHMA WITHOUT COMPLICATION: ICD-10-CM

## 2024-07-23 DIAGNOSIS — I10 ESSENTIAL HYPERTENSION: Chronic | ICD-10-CM

## 2024-07-23 DIAGNOSIS — E78.2 MIXED HYPERLIPIDEMIA: Chronic | ICD-10-CM

## 2024-07-23 DIAGNOSIS — Z12.31 ENCOUNTER FOR SCREENING MAMMOGRAM FOR MALIGNANT NEOPLASM OF BREAST: ICD-10-CM

## 2024-07-23 DIAGNOSIS — Z12.11 SCREENING FOR COLON CANCER: ICD-10-CM

## 2024-07-23 DIAGNOSIS — Z00.00 HEALTH CARE MAINTENANCE: ICD-10-CM

## 2024-07-23 DIAGNOSIS — F32.A DEPRESSION, UNSPECIFIED DEPRESSION TYPE: ICD-10-CM

## 2024-07-23 DIAGNOSIS — I10 UNCONTROLLED HYPERTENSION: Primary | ICD-10-CM

## 2024-07-23 PROBLEM — R73.03 PRE-DIABETES: Status: RESOLVED | Noted: 2019-05-30 | Resolved: 2024-07-23

## 2024-07-23 PROBLEM — M54.42 CHRONIC BILATERAL LOW BACK PAIN WITH LEFT-SIDED SCIATICA: Chronic | Status: RESOLVED | Noted: 2021-12-15 | Resolved: 2024-07-23

## 2024-07-23 PROBLEM — G89.29 CHRONIC BILATERAL LOW BACK PAIN WITH LEFT-SIDED SCIATICA: Chronic | Status: RESOLVED | Noted: 2021-12-15 | Resolved: 2024-07-23

## 2024-07-23 PROBLEM — F12.90 MARIJUANA SMOKER: Status: RESOLVED | Noted: 2022-05-31 | Resolved: 2024-07-23

## 2024-07-23 LAB — HBA1C MFR BLD: 5.6 %

## 2024-07-23 PROCEDURE — 99203 OFFICE O/P NEW LOW 30 MIN: CPT

## 2024-07-23 PROCEDURE — 4004F PT TOBACCO SCREEN RCVD TLK: CPT

## 2024-07-23 PROCEDURE — 3079F DIAST BP 80-89 MM HG: CPT

## 2024-07-23 PROCEDURE — G8427 DOCREV CUR MEDS BY ELIG CLIN: HCPCS

## 2024-07-23 PROCEDURE — 83036 HEMOGLOBIN GLYCOSYLATED A1C: CPT

## 2024-07-23 PROCEDURE — 3077F SYST BP >= 140 MM HG: CPT

## 2024-07-23 PROCEDURE — 1090F PRES/ABSN URINE INCON ASSESS: CPT

## 2024-07-23 PROCEDURE — 99213 OFFICE O/P EST LOW 20 MIN: CPT

## 2024-07-23 PROCEDURE — 3017F COLORECTAL CA SCREEN DOC REV: CPT

## 2024-07-23 PROCEDURE — G8400 PT W/DXA NO RESULTS DOC: HCPCS

## 2024-07-23 PROCEDURE — 1123F ACP DISCUSS/DSCN MKR DOCD: CPT

## 2024-07-23 PROCEDURE — G8419 CALC BMI OUT NRM PARAM NOF/U: HCPCS

## 2024-07-23 RX ORDER — LISINOPRIL AND HYDROCHLOROTHIAZIDE 25; 20 MG/1; MG/1
1 TABLET ORAL DAILY
Qty: 30 TABLET | Refills: 3 | Status: SHIPPED | OUTPATIENT
Start: 2024-07-23

## 2024-07-23 RX ORDER — SERTRALINE HYDROCHLORIDE 25 MG/1
25 TABLET, FILM COATED ORAL DAILY
Qty: 30 TABLET | Refills: 0 | Status: CANCELLED | OUTPATIENT
Start: 2024-07-23

## 2024-07-23 RX ORDER — PRAVASTATIN SODIUM 40 MG
40 TABLET ORAL NIGHTLY
Qty: 120 TABLET | Refills: 1 | Status: SHIPPED | OUTPATIENT
Start: 2024-07-23

## 2024-07-23 RX ORDER — AMLODIPINE BESYLATE 10 MG/1
10 TABLET ORAL DAILY
Qty: 30 TABLET | Refills: 5 | Status: SHIPPED | OUTPATIENT
Start: 2024-07-23

## 2024-07-23 NOTE — PROGRESS NOTES
[facility]  [facility]  [address] [place] [address]  Dept: [phone]  Dept Fax: [phone]    Office Progress/NEW TO PROVIDER.  Date of patient's visit: 7/23/2024  Patient's Name:  Monserrat Garland YOB: 1960            Patient Care Team:  Irvin Givens MD as PCP - General (Internal Medicine)  ________________________________________________________________________      Reason for Visit: Routine outpatient follow up -  New to provider.  ________________________________________________________________________  Chief Complaint:  New to Provider, prediabetes, and Hypertension    ________________________________________________________________________  History of Presenting Illness:  Patient, 64 years old female, with past medical history of  - Hypertension  - Mild intermittent asthma  - Mixed hyperlipidemia  - Sigmoid diverticulosis  - Smoking    Presents to clinic to establish care with new provider.  Patient has no complaints or concerns.  Since her last office visit patient did not had any hospital or ED visits.    Her blood pressure today in the clinic was initially elevated at 168/80 and repeat was 170/80.  Patient stated that she is not taking her medications for the last 4 to 5 days.  Otherwise patient added that she is very much compliant with her medications.  Her medications were refilled.  She was counseled to go to ED with high blood pressure but patient did not had any symptoms and she was comfortable going to her work.  She was counseled that if she is having persistently elevated blood pressure and/or she is developing chest tightness, chest pain, shortness of breath, headaches or focal deficits she needs to go to ED.  Patient verbalized understanding    She also has history of mild intermittent asthma which is controlled with albuterol as needed.  She states that he sometimes uses only 1 puff 1-2 times in a week.    Patient has been

## 2024-07-23 NOTE — PROGRESS NOTES
Attending Physician Statement  I have discussed the care of Monserrat Garland, including pertinent history and exam findings with the resident. I have reviewed the key elements of all parts of the encounter with the resident. I have seen and examined the patient with the resident and the key elements of all parts of the encounter have been performed by me.  Added history includes here for follow up to est with new resident physician. She  has a hx of HTN, dyslipidemia, asthma relatively stable and needs care gaps addressed today. She has been out of HTN meds and BP elevated today although asymptomatic.  Patient is agreeable to resume medications and understands the risks of her elevated blood pressure she is to proceed to the emergency department or an urgent care center if she develops any such symptoms.  She will return to the clinic in 1 week after resuming her meds for a repeat blood pressure check.I agree with the assessment, and status of the problem list as documented.   Diagnosis Orders   1. Uncontrolled hypertension  amLODIPine (NORVASC) 10 MG tablet      2. Essential hypertension well controlled  lisinopril-hydroCHLOROthiazide (PRINZIDE;ZESTORETIC) 20-25 MG per tablet      3. Mixed hyperlipidemia  Lipid Panel    pravastatin (PRAVACHOL) 40 MG tablet      4. Depression, unspecified depression type        5. Mild intermittent asthma without complication        6. Smoking history        7. Health care maintenance  Lipid Panel    CBC with Auto Differential    Basic Metabolic Panel      8. Encounter for screening mammogram for malignant neoplasm of breast  [facility] DIGITAL SCREEN W OR WO CAD BILATERAL      9. Screening for colon cancer  [facility] Colonoscopy      10. Sigmoid diverticulosis           The plan and orders should include   Orders Placed This Encounter   Procedures    [facility] DIGITAL SCREEN W OR WO CAD BILATERAL    Lipid Panel    CBC with Auto Differential    Basic Metabolic Panel    [facility]

## 2024-07-26 ENCOUNTER — HOSPITAL ENCOUNTER (OUTPATIENT)
Age: 64
Setting detail: SPECIMEN
Discharge: HOME | End: 2024-07-26

## 2024-07-26 DIAGNOSIS — Z00.00 HEALTH CARE MAINTENANCE: ICD-10-CM

## 2024-07-26 DIAGNOSIS — E78.2 MIXED HYPERLIPIDEMIA: Chronic | ICD-10-CM

## 2024-07-26 LAB
ANION GAP SERPL CALCULATED.3IONS-SCNC: 15 MMOL/L (ref 9–16)
BASOPHILS # BLD: 0.07 K/UL (ref 0–0.2)
BASOPHILS NFR BLD: 1 % (ref 0–2)
BUN SERPL-MCNC: 21 MG/DL (ref 8–23)
CALCIUM SERPL-MCNC: 9.1 MG/DL (ref 8.6–10.4)
CHLORIDE SERPL-SCNC: 103 MMOL/L (ref 98–107)
CHOLEST SERPL-MCNC: 211 MG/DL (ref 0–199)
CHOLESTEROL/HDL RATIO: 4
CO2 SERPL-SCNC: 23 MMOL/L (ref 20–31)
CREAT SERPL-MCNC: 1 MG/DL (ref 0.5–0.9)
EOSINOPHIL # BLD: 0.24 K/UL (ref 0–0.44)
EOSINOPHILS RELATIVE PERCENT: 3 % (ref 1–4)
ERYTHROCYTE [DISTWIDTH] IN BLOOD BY AUTOMATED COUNT: 14.6 % (ref 11.8–14.4)
GFR, ESTIMATED: 60 ML/MIN/1.73M2
GLUCOSE SERPL-MCNC: 72 MG/DL (ref 74–99)
HCT VFR BLD AUTO: 40.2 % (ref 36.3–47.1)
HDLC SERPL-MCNC: 55 MG/DL
HGB BLD-MCNC: 13.3 G/DL (ref 11.9–15.1)
IMM GRANULOCYTES # BLD AUTO: 0.03 K/UL (ref 0–0.3)
IMM GRANULOCYTES NFR BLD: 0 %
LDLC SERPL CALC-MCNC: 135 MG/DL (ref 0–100)
LYMPHOCYTES NFR BLD: 2.03 K/UL (ref 1.1–3.7)
LYMPHOCYTES RELATIVE PERCENT: 28 % (ref 24–43)
MCH RBC QN AUTO: 31.2 PG (ref 25.2–33.5)
MCHC RBC AUTO-ENTMCNC: 33.1 G/DL (ref 28.4–34.8)
MCV RBC AUTO: 94.4 FL (ref 82.6–102.9)
MONOCYTES NFR BLD: 0.63 K/UL (ref 0.1–1.2)
MONOCYTES NFR BLD: 9 % (ref 3–12)
NEUTROPHILS NFR BLD: 59 % (ref 36–65)
NEUTS SEG NFR BLD: 4.36 K/UL (ref 1.5–8.1)
NRBC BLD-RTO: 0 PER 100 WBC
PLATELET # BLD AUTO: 231 K/UL (ref 138–453)
PMV BLD AUTO: 11.5 FL (ref 8.1–13.5)
POTASSIUM SERPL-SCNC: 3.8 MMOL/L (ref 3.7–5.3)
RBC # BLD AUTO: 4.26 M/UL (ref 3.95–5.11)
RBC # BLD: ABNORMAL 10*6/UL
SODIUM SERPL-SCNC: 141 MMOL/L (ref 136–145)
TRIGL SERPL-MCNC: 102 MG/DL
VLDLC SERPL CALC-MCNC: 20 MG/DL
WBC OTHER # BLD: 7.4 K/UL (ref 3.5–11.3)

## 2024-07-30 DIAGNOSIS — R79.89 ELEVATED SERUM CREATININE: Primary | ICD-10-CM

## 2024-09-12 DIAGNOSIS — I10 UNCONTROLLED HYPERTENSION: ICD-10-CM

## 2024-09-12 DIAGNOSIS — I10 ESSENTIAL HYPERTENSION: Chronic | ICD-10-CM

## 2024-09-12 RX ORDER — LISINOPRIL AND HYDROCHLOROTHIAZIDE 20; 25 MG/1; MG/1
1 TABLET ORAL DAILY
Qty: 90 TABLET | Refills: 3 | Status: SHIPPED | OUTPATIENT
Start: 2024-09-12

## 2024-09-12 RX ORDER — AMLODIPINE BESYLATE 10 MG/1
10 TABLET ORAL DAILY
Qty: 90 TABLET | Refills: 3 | Status: SHIPPED | OUTPATIENT
Start: 2024-09-12

## 2024-09-18 DIAGNOSIS — I10 ESSENTIAL HYPERTENSION: Chronic | ICD-10-CM

## 2024-09-18 DIAGNOSIS — I10 UNCONTROLLED HYPERTENSION: ICD-10-CM

## 2024-09-18 RX ORDER — AMLODIPINE BESYLATE 10 MG/1
10 TABLET ORAL DAILY
Qty: 90 TABLET | Refills: 3 | Status: CANCELLED | OUTPATIENT
Start: 2024-09-18

## 2024-09-18 RX ORDER — LISINOPRIL AND HYDROCHLOROTHIAZIDE 20; 25 MG/1; MG/1
1 TABLET ORAL DAILY
Qty: 90 TABLET | Refills: 3 | Status: CANCELLED | OUTPATIENT
Start: 2024-09-18

## 2024-09-18 NOTE — TELEPHONE ENCOUNTER
Request for   Requested Prescriptions      No prescriptions requested or ordered in this encounter    .      Please review and e-scribe to pharmacy listed in chart if appropriate. Thank you.      Last Visit Date: 7/23/2024  Next Visit Date: Visit date not found    No future appointments.    Health Maintenance   Topic Date Due    DEXA (modify frequency per FRAX score)  Never done    Respiratory Syncytial Virus (RSV) Pregnant or age 60 yrs+ (1 - 1-dose 60+ series) Never done    Pneumococcal 65+ years Vaccine (2 of 2 - PCV) 10/30/2018    Breast cancer screen  01/30/2019    Colorectal Cancer Screen  03/16/2022    Flu vaccine (1) 08/01/2024    COVID-19 Vaccine (3 - 2023-24 season) 09/01/2024    DTaP/Tdap/Td vaccine (2 - Td or Tdap) 09/08/2024    Shingles vaccine (1 of 2) 10/30/2024 (Originally 4/12/2008)    Depression Monitoring  03/26/2025    Lipids  07/26/2025    Hepatitis C screen  Completed    Hepatitis A vaccine  Aged Out    Hepatitis B vaccine  Aged Out    Hib vaccine  Aged Out    Polio vaccine  Aged Out    Meningococcal (ACWY) vaccine  Aged Out    A1C test (Diabetic or Prediabetic)  Discontinued    Depression Screen  Discontinued    Pneumococcal 0-64 years Vaccine  Discontinued    HIV screen  Discontinued       Hemoglobin A1C (%)   Date Value   07/23/2024 5.6   02/24/2023 5.6   12/15/2021 5.5             ( goal A1C is < 7)   No components found for: \"LABMICR\"  No components found for: \"LDLCHOLESTEROL\", \"LDLCALC\"    (goal LDL is <100)   AST (U/L)   Date Value   03/09/2022 32 (H)     ALT (U/L)   Date Value   03/09/2022 17     BUN (mg/dL)   Date Value   07/26/2024 21     BP Readings from Last 3 Encounters:   07/23/24 (!) 170/80   03/26/24 128/80   02/06/24 (!) 187/100          (goal 120/80)    All Future Testing planned in [facility]  Lab Frequency Next Occurrence   DEXA BONE DENSITY 2 SITES Once 02/06/2024   Basic Metabolic Panel Once 02/06/2024   Electrolyte Panel Once 03/26/2024   Vitamin B12 & Folate Once

## 2025-02-10 ENCOUNTER — TELEPHONE (OUTPATIENT)
Dept: INTERNAL MEDICINE | Age: 65
End: 2025-02-10

## 2025-02-10 NOTE — TELEPHONE ENCOUNTER
Returned the patients call to get her scheduled but was unable to reach her by phone. Left a voice mail asking her to return the call tot he office at her convenience to get scheduled.

## 2025-02-10 NOTE — TELEPHONE ENCOUNTER
----- Message from Cooper THOMAS sent at 2/10/2025  1:12 PM EST -----  Regarding: ECC Escalation To Practice  ECC Escalation To Practice      Type of Escalation: Red Flag Symptom  --------------------------------------------------------------------------------------------------------------------------    Information for Provider:  Patient is looking for appointment for: Symptom  left hand numbness, having problems raising her right arm above her head  Reasons for Message: Patient disconnected     Additional Information none  --------------------------------------------------------------------------------------------------------------------------    Relationship to Patient: Self     Call Back Info: OK to leave message on voicemail  Preferred Call Back Number: Phone [phone] (home) [phone] (work)

## 2025-02-13 ENCOUNTER — PREP FOR PROCEDURE (OUTPATIENT)
Dept: GASTROENTEROLOGY | Age: 65
End: 2025-02-13

## 2025-02-13 DIAGNOSIS — Z12.11 COLON CANCER SCREENING: ICD-10-CM

## 2025-02-13 RX ORDER — POLYETHYLENE GLYCOL 3350, SODIUM SULFATE ANHYDROUS, SODIUM BICARBONATE, SODIUM CHLORIDE, POTASSIUM CHLORIDE 236; 22.74; 6.74; 5.86; 2.97 G/4L; G/4L; G/4L; G/4L; G/4L
POWDER, FOR SOLUTION ORAL
Qty: 4000 ML | Refills: 0 | Status: SHIPPED | OUTPATIENT
Start: 2025-02-13

## 2025-02-13 RX ORDER — BISACODYL 5 MG/1
TABLET, DELAYED RELEASE ORAL
Qty: 4 TABLET | Refills: 0 | Status: SHIPPED | OUTPATIENT
Start: 2025-02-13

## 2025-02-13 NOTE — TELEPHONE ENCOUNTER
Procedure scheduled/Brian  Procedure:colon  Dx:screening  Referring:  Date:02/28/2025  Time:1115 am   Hospital:[facility]  Bowel Prep:GOLYTLEY/DULCOLAX  Patient advised by phone:PHONE  Clearance:NONE  GLP-1:

## 2025-03-15 PROBLEM — Z12.11 COLON CANCER SCREENING: Status: RESOLVED | Noted: 2025-02-13 | Resolved: 2025-03-15

## 2025-08-25 ENCOUNTER — OFFICE VISIT (OUTPATIENT)
Age: 65
End: 2025-08-25
Payer: MEDICARE

## 2025-08-25 VITALS
DIASTOLIC BLOOD PRESSURE: 78 MMHG | HEIGHT: 61 IN | OXYGEN SATURATION: 96 % | WEIGHT: 154 LBS | HEART RATE: 84 BPM | SYSTOLIC BLOOD PRESSURE: 130 MMHG | BODY MASS INDEX: 29.07 KG/M2

## 2025-08-25 DIAGNOSIS — E78.2 MIXED HYPERLIPIDEMIA: ICD-10-CM

## 2025-08-25 DIAGNOSIS — Z13.1 DIABETES MELLITUS SCREENING: ICD-10-CM

## 2025-08-25 DIAGNOSIS — M79.604 PAIN OF RIGHT LOWER EXTREMITY: ICD-10-CM

## 2025-08-25 DIAGNOSIS — Z12.31 BREAST CANCER SCREENING BY MAMMOGRAM: ICD-10-CM

## 2025-08-25 DIAGNOSIS — I10 ESSENTIAL HYPERTENSION: Primary | ICD-10-CM

## 2025-08-25 DIAGNOSIS — K57.30 SIGMOID DIVERTICULOSIS: ICD-10-CM

## 2025-08-25 DIAGNOSIS — M81.0 OSTEOPOROSIS, UNSPECIFIED OSTEOPOROSIS TYPE, UNSPECIFIED PATHOLOGICAL FRACTURE PRESENCE: ICD-10-CM

## 2025-08-25 LAB — HBA1C MFR BLD: 5.9 %

## 2025-08-25 PROCEDURE — 1123F ACP DISCUSS/DSCN MKR DOCD: CPT

## 2025-08-25 PROCEDURE — 3017F COLORECTAL CA SCREEN DOC REV: CPT

## 2025-08-25 PROCEDURE — G8427 DOCREV CUR MEDS BY ELIG CLIN: HCPCS

## 2025-08-25 PROCEDURE — 83036 HEMOGLOBIN GLYCOSYLATED A1C: CPT

## 2025-08-25 PROCEDURE — 3075F SYST BP GE 130 - 139MM HG: CPT

## 2025-08-25 PROCEDURE — 99214 OFFICE O/P EST MOD 30 MIN: CPT

## 2025-08-25 PROCEDURE — 1159F MED LIST DOCD IN RCRD: CPT

## 2025-08-25 PROCEDURE — 4004F PT TOBACCO SCREEN RCVD TLK: CPT

## 2025-08-25 PROCEDURE — 1090F PRES/ABSN URINE INCON ASSESS: CPT

## 2025-08-25 PROCEDURE — 99213 OFFICE O/P EST LOW 20 MIN: CPT

## 2025-08-25 PROCEDURE — PBSHW POCT GLYCOSYLATED HEMOGLOBIN (HGB A1C): Performed by: HOSPITALIST

## 2025-08-25 PROCEDURE — G8419 CALC BMI OUT NRM PARAM NOF/U: HCPCS

## 2025-08-25 PROCEDURE — 3078F DIAST BP <80 MM HG: CPT

## 2025-08-25 PROCEDURE — G8400 PT W/DXA NO RESULTS DOC: HCPCS

## 2025-08-25 RX ORDER — PRAVASTATIN SODIUM 40 MG
40 TABLET ORAL EVERY EVENING
Qty: 30 TABLET | Refills: 3 | Status: SHIPPED | OUTPATIENT
Start: 2025-08-25

## 2025-08-25 RX ORDER — AMLODIPINE BESYLATE 10 MG/1
10 TABLET ORAL DAILY
Qty: 30 TABLET | Refills: 0 | Status: SHIPPED | OUTPATIENT
Start: 2025-08-25

## 2025-08-25 RX ORDER — LISINOPRIL AND HYDROCHLOROTHIAZIDE 20; 25 MG/1; MG/1
1 TABLET ORAL DAILY
Qty: 90 TABLET | Refills: 1 | Status: SHIPPED | OUTPATIENT
Start: 2025-08-25

## 2025-08-25 SDOH — ECONOMIC STABILITY: FOOD INSECURITY: WITHIN THE PAST 12 MONTHS, THE FOOD YOU BOUGHT JUST DIDN'T LAST AND YOU DIDN'T HAVE MONEY TO GET MORE.: NEVER TRUE

## 2025-08-25 SDOH — ECONOMIC STABILITY: FOOD INSECURITY: WITHIN THE PAST 12 MONTHS, YOU WORRIED THAT YOUR FOOD WOULD RUN OUT BEFORE YOU GOT MONEY TO BUY MORE.: NEVER TRUE

## 2025-08-25 ASSESSMENT — PATIENT HEALTH QUESTIONNAIRE - PHQ9
SUM OF ALL RESPONSES TO PHQ QUESTIONS 1-9: 0
SUM OF ALL RESPONSES TO PHQ QUESTIONS 1-9: 0
2. FEELING DOWN, DEPRESSED OR HOPELESS: NOT AT ALL
SUM OF ALL RESPONSES TO PHQ QUESTIONS 1-9: 0
1. LITTLE INTEREST OR PLEASURE IN DOING THINGS: NOT AT ALL
SUM OF ALL RESPONSES TO PHQ QUESTIONS 1-9: 0